# Patient Record
Sex: FEMALE | Race: WHITE | NOT HISPANIC OR LATINO | Employment: OTHER | URBAN - METROPOLITAN AREA
[De-identification: names, ages, dates, MRNs, and addresses within clinical notes are randomized per-mention and may not be internally consistent; named-entity substitution may affect disease eponyms.]

---

## 2017-03-13 ENCOUNTER — HOSPITAL ENCOUNTER (OUTPATIENT)
Dept: CARDIOLOGY | Facility: HOSPITAL | Age: 67
Discharge: HOME | End: 2017-03-13
Attending: INTERNAL MEDICINE | Admitting: INTERNAL MEDICINE

## 2017-03-13 ENCOUNTER — OFFICE VISIT (OUTPATIENT)
Dept: CARDIOLOGY | Facility: CLINIC | Age: 67
End: 2017-03-13

## 2017-03-13 VITALS — BODY MASS INDEX: 47.46 KG/M2 | RESPIRATION RATE: 18 BRPM | WEIGHT: 235 LBS

## 2017-03-13 VITALS
DIASTOLIC BLOOD PRESSURE: 70 MMHG | SYSTOLIC BLOOD PRESSURE: 122 MMHG | HEART RATE: 67 BPM | HEIGHT: 59 IN | BODY MASS INDEX: 47.37 KG/M2 | WEIGHT: 235 LBS

## 2017-03-13 DIAGNOSIS — I20.0 UNSTABLE ANGINA (HCC): Primary | ICD-10-CM

## 2017-03-13 DIAGNOSIS — I25.10 CORONARY ARTERY DISEASE INVOLVING NATIVE CORONARY ARTERY OF NATIVE HEART WITHOUT ANGINA PECTORIS: ICD-10-CM

## 2017-03-13 DIAGNOSIS — I20.0 UNSTABLE ANGINA (HCC): ICD-10-CM

## 2017-03-13 DIAGNOSIS — I10 ESSENTIAL HYPERTENSION: Primary | ICD-10-CM

## 2017-03-13 DIAGNOSIS — E66.01 MORBID OBESITY DUE TO EXCESS CALORIES (HCC): ICD-10-CM

## 2017-03-13 PROCEDURE — 93017 CV STRESS TEST TRACING ONLY: CPT

## 2017-03-13 PROCEDURE — 99213 OFFICE O/P EST LOW 20 MIN: CPT | Performed by: INTERNAL MEDICINE

## 2017-03-13 PROCEDURE — 93016 CV STRESS TEST SUPVJ ONLY: CPT | Performed by: INTERNAL MEDICINE

## 2017-03-13 PROCEDURE — 93018 CV STRESS TEST I&R ONLY: CPT | Performed by: INTERNAL MEDICINE

## 2017-03-13 RX ORDER — VENLAFAXINE 75 MG/1
75 TABLET ORAL 2 TIMES DAILY
COMMUNITY
End: 2019-11-11

## 2017-03-13 RX ORDER — LORATADINE 10 MG/1
10 TABLET ORAL DAILY
COMMUNITY

## 2017-03-13 NOTE — PROGRESS NOTES
Subjective:       Yue Joe is a 67 y.o. female who here for follow up    CC  Chest pain  HPI  67-year-old white female with the benign essential arterial hypertension as well as obesity still complains of atypical nonexertional retrosternal chest pain intermittent lasting for a few minutes     Problem List Items Addressed This Visit        Cardiovascular and Mediastinum    Essential hypertension - Primary       Digestive    Obesity        Previous treatments/evaluations include: ASA. Cardiac risk factors: advanced age (older than 55 for men, 65 for women).    The following portions of the patient's history were reviewed and updated as appropriate: allergies, current medications, past family history, past medical history, past social history, past surgical history and problem list.    Past Medical History   Diagnosis Date   • Breast cancer    • Environmental allergies    • GERD (gastroesophageal reflux disease)    • Hyperlipidemia    • Hypertension     reports that she has quit smoking. She has a 15.00 pack-year smoking history. She has never used smokeless tobacco. She reports that she does not drink alcohol or use illicit drugs.  Family History   Problem Relation Age of Onset   • Heart disease Mother    • Heart attack Mother    • Hypertension Mother    • Heart attack Father    • Heart disease Father    • Hypertension Father    • Heart attack Sister    • Hypertension Sister    • Hypertension Brother    • Heart attack Brother    • Heart attack Maternal Grandmother    • Hypertension Maternal Grandmother    • Hypertension Maternal Grandfather    • Heart attack Maternal Grandfather    • Heart attack Paternal Grandmother    • Hypertension Paternal Grandmother        Review of Systems  Constitutional: No wt loss, fever, fatigue  Gastrointestinal: No nausea, abdominal pain  Behavioral/Psych: No insomnia or anxiety   Cardiovascular chest pain  Objective:       Physical Exam             Physical Exam  Visit Vitals   •  "/70 (BP Location: Right arm, Patient Position: Sitting)   • Pulse 67   • Ht 59\" (149.9 cm)   • Wt 235 lb (107 kg)   • BMI 47.46 kg/m2       General appearance: NAD, conversant   Eyes: anicteric sclerae, moist conjunctivae; no lid-lag; PERRLA   HENT: Atraumatic; oropharynx clear with moist mucous membranes and no mucosal ulcerations;  normal hard and soft palate   Neck: Trachea midline; FROM, supple, no thyromegaly or lymphadenopathy   Lungs: CTA, with normal respiratory effort and no intercostal retractions   CV: S1-S2 regular, no murmurs, no rub, no gallop   Abdomen: Soft, non-tender; no masses or HSM   Extremities: No peripheral edema or extremity lymphadenopathy  Skin: Normal temperature, turgor and texture; no rash, ulcers or subcutaneous nodules   Psych: Appropriate affect, alert and oriented to person, place and time           Cardiographics  @Procedures    Echocardiogram:        Current Outpatient Prescriptions:   •  aspirin 81 MG tablet, Take 1 tablet by mouth daily., Disp: 30 tablet, Rfl: 11  •  clopidogrel (PLAVIX) 75 MG tablet, Take 1 tablet by mouth daily., Disp: 30 tablet, Rfl: 11  •  lisinopril (PRINIVIL,ZESTRIL) 5 MG tablet, Take 1 tablet by mouth daily., Disp: 90 tablet, Rfl: 0  •  loratadine (CLARITIN) 10 MG tablet, Take 10 mg by mouth Daily., Disp: , Rfl:   •  metoprolol tartrate (LOPRESSOR) 25 MG tablet, Take 1 tablet by mouth 2 (two) times a day., Disp: 180 tablet, Rfl: 0  •  Mirabegron ER (MYRBETRIQ) 50 MG tablet sustained-release 24 hour 24 hr tablet, Take 50 mg by mouth Every Other Day., Disp: , Rfl:   •  nitroglycerin (NITROSTAT) 0.4 MG SL tablet, 1 under the tongue as needed for angina, may repeat q5mins for up three doses, Disp: 100 tablet, Rfl: 11  •  ranitidine (ZANTAC) 150 MG tablet, Take 150 mg by mouth 2 (two) times a day., Disp: , Rfl:   •  venlafaxine (EFFEXOR) 75 MG tablet, Take 75 mg by mouth 2 (Two) Times a Day., Disp: , Rfl:   •  vitamin D (ERGOCALCIFEROL) 14489 UNITS " capsule capsule, Take 50,000 Units by mouth 1 (one) time per week., Disp: , Rfl:   •  cetirizine (ZyrTEC) 10 MG tablet, Take 10 mg by mouth daily., Disp: , Rfl:    Assessment:        Patient Active Problem List   Diagnosis   • Ischemic chest pain   • Essential hypertension   • Obesity   • Unstable angina   • Coronary artery disease involving native coronary artery of native heart without angina pectoris         HEMODYNAMIC / ANGIOGRAPHIC DATA:   1. Left ventricular end diastolic pressure was 10 mmHg.  2. Left ventriculography revealed an EF around 50%.   3. The left main is normal.  4. The left anterior descending artery is proximal 80% reduced to 0% with 2.5/15 XIENCE dilated to 2.6.  5. The left circumflex is early atherosclerotic plaque with no significant stenosis.  6. The right coronary artery is dominant with early atherosclerotic plaque.  Successful stenting of the proximal LAD, with reduction of stenosis from 80 % to 0 %.      Plan:         Importance of controlling hypertension and blood pressure  checkup on the regular basis has been explained  Hypertension as a silent killer has been discussed  Risk reduction of the weight and regular exercises to control the hypertension has been explained    Blood pressure well-controlled    Significant risk of obesity to CAD, HTN has been explained  Advantages of wt reduction has been explained      ICD-10-CM ICD-9-CM   1. Essential hypertension I10 401.9   2. Morbid obesity due to excess calories E66.01 278.01     Still have cp relieved with ntg    ETT    SEE US 6 MONTHS  COUNSELING:    Yue Farfan was given to patient for the following topics: diagnostic results, risk factor reductions, impressions, risks and benefits of treatment options and importance of treatment compliance .       SMOKING COUNSELING:    Counseling given: Not Answered      EMR Dragon/Transcription disclaimer:   Much of this encounter note is an electronic transcription/translation of  spoken language to printed text. The electronic translation of spoken language may permit erroneous, or at times, nonsensical words or phrases to be inadvertently transcribed; Although I have reviewed the note for such errors, some may still exist.

## 2017-03-14 LAB
BH CV STRESS BP STAGE 1: NORMAL
BH CV STRESS BP STAGE 2: NORMAL
BH CV STRESS BP STAGE 3: NORMAL
BH CV STRESS DURATION MIN STAGE 1: 3
BH CV STRESS DURATION MIN STAGE 2: 3
BH CV STRESS DURATION MIN STAGE 3: 1
BH CV STRESS DURATION SEC STAGE 1: 0
BH CV STRESS DURATION SEC STAGE 2: 0
BH CV STRESS DURATION SEC STAGE 3: 54
BH CV STRESS GRADE STAGE 1: 0
BH CV STRESS GRADE STAGE 2: 5
BH CV STRESS GRADE STAGE 3: 10
BH CV STRESS HR STAGE 1: 100
BH CV STRESS HR STAGE 2: 118
BH CV STRESS HR STAGE 3: 127
BH CV STRESS METS STAGE 1: 2.3
BH CV STRESS METS STAGE 2: 3.5
BH CV STRESS METS STAGE 3: 4.5
BH CV STRESS O2 STAGE 3: 93
BH CV STRESS PROTOCOL 1: NORMAL
BH CV STRESS RECOVERY BP: NORMAL MMHG
BH CV STRESS RECOVERY HR: 75 BPM
BH CV STRESS RECOVERY O2: 94 %
BH CV STRESS SPEED STAGE 1: 1.7
BH CV STRESS SPEED STAGE 2: 1.7
BH CV STRESS SPEED STAGE 3: 1.7
BH CV STRESS STAGE 1: 0
BH CV STRESS STAGE 2: NORMAL
BH CV STRESS STAGE 3: 1
MAXIMAL PREDICTED HEART RATE: 155 BPM
PERCENT MAX PREDICTED HR: 81.94 %
STRESS BASELINE BP: NORMAL MMHG
STRESS BASELINE HR: 65 BPM
STRESS O2 SAT REST: 95 %
STRESS PERCENT HR: 96 %
STRESS POST ESTIMATED WORKLOAD: 4.5 METS
STRESS POST EXERCISE DUR MIN: 7 MIN
STRESS POST EXERCISE DUR SEC: 54 SEC
STRESS POST O2 SAT PEAK: 93 %
STRESS POST PEAK BP: NORMAL MMHG
STRESS POST PEAK HR: 127 BPM
STRESS TARGET HR: 132 BPM

## 2017-07-13 ENCOUNTER — OFFICE VISIT (OUTPATIENT)
Dept: CARDIOLOGY | Facility: CLINIC | Age: 67
End: 2017-07-13

## 2017-07-13 VITALS
HEIGHT: 59 IN | SYSTOLIC BLOOD PRESSURE: 132 MMHG | DIASTOLIC BLOOD PRESSURE: 86 MMHG | HEART RATE: 73 BPM | WEIGHT: 236 LBS | BODY MASS INDEX: 47.58 KG/M2

## 2017-07-13 DIAGNOSIS — R07.89 OTHER CHEST PAIN: ICD-10-CM

## 2017-07-13 DIAGNOSIS — Z01.810 PRE-OPERATIVE CARDIOVASCULAR EXAMINATION: ICD-10-CM

## 2017-07-13 DIAGNOSIS — I20.9 ISCHEMIC CHEST PAIN (HCC): Primary | ICD-10-CM

## 2017-07-13 PROCEDURE — 99213 OFFICE O/P EST LOW 20 MIN: CPT | Performed by: INTERNAL MEDICINE

## 2017-07-13 PROCEDURE — 93000 ELECTROCARDIOGRAM COMPLETE: CPT | Performed by: INTERNAL MEDICINE

## 2017-07-13 RX ORDER — PANTOPRAZOLE SODIUM 40 MG/1
40 TABLET, DELAYED RELEASE ORAL DAILY
COMMUNITY
End: 2020-10-06 | Stop reason: HOSPADM

## 2017-07-13 NOTE — PROGRESS NOTES
" Subjective:       Yue Joe is a 67 y.o. female who here for follow up    CC  Cp 4 days ago went to er, with burning and sob  HPI  67-year-old white female was initially evaluated in March 2017 for the chest pain, had a stress test which was negative, July 2016 patient underwent angioplasty and stent, was seen in emergency room 4 days ago because of the chest pains burning in nature with shortness of breath, moderate severe in intensity     Problem List Items Addressed This Visit     None        .    The following portions of the patient's history were reviewed and updated as appropriate: allergies, current medications, past family history, past medical history, past social history, past surgical history and problem list.    Past Medical History:   Diagnosis Date   • Breast cancer    • Environmental allergies    • GERD (gastroesophageal reflux disease)    • Hyperlipidemia    • Hypertension     reports that she has quit smoking. She has a 15.00 pack-year smoking history. She has never used smokeless tobacco. She reports that she does not drink alcohol or use illicit drugs.  Family History   Problem Relation Age of Onset   • Heart disease Mother    • Heart attack Mother    • Hypertension Mother    • Heart attack Father    • Heart disease Father    • Hypertension Father    • Heart attack Sister    • Hypertension Sister    • Hypertension Brother    • Heart attack Brother    • Heart attack Maternal Grandmother    • Hypertension Maternal Grandmother    • Hypertension Maternal Grandfather    • Heart attack Maternal Grandfather    • Heart attack Paternal Grandmother    • Hypertension Paternal Grandmother        Review of Systems  Constitutional: No wt loss, fever, fatigue  Gastrointestinal: No nausea, abdominal pain  Behavioral/Psych: No insomnia or anxiety   Cardiovascular Chest pains and tightness in chest with a burning sensation  Objective:       Physical Exam             Physical Exam  /86  Pulse 73  Ht 59\" " (149.9 cm)  Wt 236 lb (107 kg)  BMI 47.67 kg/m2    General appearance: NAD, conversant   Eyes: anicteric sclerae, moist conjunctivae; no lid-lag; PERRLA   HENT: Atraumatic; oropharynx clear with moist mucous membranes and no mucosal ulcerations;  normal hard and soft palate   Neck: Trachea midline; FROM, supple, no thyromegaly or lymphadenopathy   Lungs: CTA, with normal respiratory effort and no intercostal retractions   CV: S1-S2 regular, no murmurs, no rub, no gallop   Abdomen: Soft, non-tender; no masses or HSM   Extremities: No peripheral edema or extremity lymphadenopathy  Skin: Normal temperature, turgor and texture; no rash, ulcers or subcutaneous nodules   Psych: Appropriate affect, alert and oriented to person, place and time           Cardiographics  @  ECG 12 Lead  Date/Time: 7/13/2017 2:31 PM  Performed by: SURESH LYNN  Authorized by: SURESH LYNN   Comparison: compared with previous ECG   Comparison to previous ECG: pvcs are new  Rhythm: sinus rhythm  ST Flattening: all  Clinical impression: abnormal ECG            Echocardiogram:        Current Outpatient Prescriptions:   •  aspirin 81 MG tablet, Take 1 tablet by mouth daily., Disp: 30 tablet, Rfl: 11  •  cetirizine (ZyrTEC) 10 MG tablet, Take 10 mg by mouth daily., Disp: , Rfl:   •  clopidogrel (PLAVIX) 75 MG tablet, Take 1 tablet by mouth daily., Disp: 30 tablet, Rfl: 11  •  lisinopril (PRINIVIL,ZESTRIL) 5 MG tablet, Take 1 tablet by mouth daily., Disp: 90 tablet, Rfl: 0  •  loratadine (CLARITIN) 10 MG tablet, Take 10 mg by mouth Daily., Disp: , Rfl:   •  metoprolol tartrate (LOPRESSOR) 25 MG tablet, Take 1 tablet by mouth 2 (two) times a day., Disp: 180 tablet, Rfl: 0  •  pantoprazole (PROTONIX) 40 MG EC tablet, Take 40 mg by mouth Daily., Disp: , Rfl:   •  venlafaxine (EFFEXOR) 75 MG tablet, Take 75 mg by mouth 2 (Two) Times a Day., Disp: , Rfl:   •  vitamin D (ERGOCALCIFEROL) 05719 UNITS capsule capsule, Take 50,000 Units by  mouth 1 (one) time per week., Disp: , Rfl:   •  Mirabegron ER (MYRBETRIQ) 50 MG tablet sustained-release 24 hour 24 hr tablet, Take 50 mg by mouth Every Other Day., Disp: , Rfl:   •  nitroglycerin (NITROSTAT) 0.4 MG SL tablet, 1 under the tongue as needed for angina, may repeat q5mins for up three doses, Disp: 100 tablet, Rfl: 11   Assessment:        Patient Active Problem List   Diagnosis   • Ischemic chest pain   • Essential hypertension   • Obesity   • Unstable angina   • Coronary artery disease involving native coronary artery of native heart without angina pectoris     HEMODYNAMIC / ANGIOGRAPHIC DATA:   1. Left ventricular end diastolic pressure was 10 mmHg.  2. Left ventriculography revealed an EF around 50%.   3. The left main is normal.  4. The left anterior descending artery is proximal 80% reduced to 0% with 2.5/15 XIENCE dilated to 2.6.  5. The left circumflex is early atherosclerotic plaque with no significant stenosis.  6. The right coronary artery is dominant with early atherosclerotic plaque.  7. Successful stenting of the proximal LAD, with reduction of stenosis from 80 % to 0 %.     RECOMMENDATIONS: Post-procedure care will focus on prevention of any ischemic events and congestive complications. Aggressive risk factor modification will be carried out.     Type of lesion B plus     ANG flow 3 preangioplasty 3 post angioplasty             Plan:            ICD-10-CM ICD-9-CM   1. Ischemic chest pain I20.9 786.50   2. Pre-operative cardiovascular examination Z01.810 V72.81   3. Other chest pain  R07.89 786.59     1. Ischemic chest pain  With a recurrent episodes of chest pains seated in October the emergency room patient will need further ischemic workup  - ECG 12 Lead  - Case Request Cath Lab: Left Heart Cath  - CBC & Differential  - Basic Metabolic Panel  - aPTT  - Protime-INR    2. Pre-operative cardiovascular examination    - CBC & Differential  - Basic Metabolic Panel  - aPTT  - Protime-INR    3.  Other chest pain     - aPTT   Recurrent cp    Unstable angina  Will proceed with cath    Procedure, risks and options of cardiac cath explained to pt INCLUDING BUT NOT LIMITED TO MI, STROKE, DEATH, INFECTION HAEMORRHAGE, . Pt understands well and agrees with no further questions.  COUNSELING:    Yue Farfan was given to patient for the following topics: diagnostic results, risk factor reductions, impressions, risks and benefits of treatment options and importance of treatment compliance .       SMOKING COUNSELING:    Counseling given: Not Answered      EMR Dragon/Transcription disclaimer:   Much of this encounter note is an electronic transcription/translation of spoken language to printed text. The electronic translation of spoken language may permit erroneous, or at times, nonsensical words or phrases to be inadvertently transcribed; Although I have reviewed the note for such errors, some may still exist.

## 2017-07-19 ENCOUNTER — APPOINTMENT (OUTPATIENT)
Dept: LAB | Facility: HOSPITAL | Age: 67
End: 2017-07-19

## 2017-07-19 LAB
ANION GAP SERPL CALCULATED.3IONS-SCNC: 11.2 MMOL/L
APTT PPP: 25.3 SECONDS (ref 22.7–35.4)
BASOPHILS # BLD AUTO: 0.05 10*3/MM3 (ref 0–0.2)
BASOPHILS NFR BLD AUTO: 0.7 % (ref 0–1.5)
BUN BLD-MCNC: 14 MG/DL (ref 8–23)
BUN/CREAT SERPL: 15.6 (ref 7–25)
CALCIUM SPEC-SCNC: 10 MG/DL (ref 8.6–10.5)
CHLORIDE SERPL-SCNC: 102 MMOL/L (ref 98–107)
CO2 SERPL-SCNC: 26.8 MMOL/L (ref 22–29)
CREAT BLD-MCNC: 0.9 MG/DL (ref 0.57–1)
DEPRECATED RDW RBC AUTO: 43.5 FL (ref 37–54)
EOSINOPHIL # BLD AUTO: 0.14 10*3/MM3 (ref 0–0.7)
EOSINOPHIL NFR BLD AUTO: 1.9 % (ref 0.3–6.2)
ERYTHROCYTE [DISTWIDTH] IN BLOOD BY AUTOMATED COUNT: 13 % (ref 11.7–13)
GFR SERPL CREATININE-BSD FRML MDRD: 63 ML/MIN/1.73
GLUCOSE BLD-MCNC: 106 MG/DL (ref 65–99)
HCT VFR BLD AUTO: 49 % (ref 35.6–45.5)
HGB BLD-MCNC: 16 G/DL (ref 11.9–15.5)
IMM GRANULOCYTES # BLD: 0.03 10*3/MM3 (ref 0–0.03)
IMM GRANULOCYTES NFR BLD: 0.4 % (ref 0–0.5)
INR PPP: 1.03 (ref 0.9–1.1)
LYMPHOCYTES # BLD AUTO: 2.81 10*3/MM3 (ref 0.9–4.8)
LYMPHOCYTES NFR BLD AUTO: 38.2 % (ref 19.6–45.3)
MCH RBC QN AUTO: 30.1 PG (ref 26.9–32)
MCHC RBC AUTO-ENTMCNC: 32.7 G/DL (ref 32.4–36.3)
MCV RBC AUTO: 92.1 FL (ref 80.5–98.2)
MONOCYTES # BLD AUTO: 0.46 10*3/MM3 (ref 0.2–1.2)
MONOCYTES NFR BLD AUTO: 6.3 % (ref 5–12)
NEUTROPHILS # BLD AUTO: 3.87 10*3/MM3 (ref 1.9–8.1)
NEUTROPHILS NFR BLD AUTO: 52.5 % (ref 42.7–76)
PLATELET # BLD AUTO: 241 10*3/MM3 (ref 140–500)
PMV BLD AUTO: 10.4 FL (ref 6–12)
POTASSIUM BLD-SCNC: 4.5 MMOL/L (ref 3.5–5.2)
PROTHROMBIN TIME: 13.1 SECONDS (ref 11.7–14.2)
RBC # BLD AUTO: 5.32 10*6/MM3 (ref 3.9–5.2)
SODIUM BLD-SCNC: 140 MMOL/L (ref 136–145)
WBC NRBC COR # BLD: 7.36 10*3/MM3 (ref 4.5–10.7)

## 2017-07-19 PROCEDURE — 85025 COMPLETE CBC W/AUTO DIFF WBC: CPT | Performed by: INTERNAL MEDICINE

## 2017-07-19 PROCEDURE — 85730 THROMBOPLASTIN TIME PARTIAL: CPT | Performed by: INTERNAL MEDICINE

## 2017-07-19 PROCEDURE — 80048 BASIC METABOLIC PNL TOTAL CA: CPT | Performed by: INTERNAL MEDICINE

## 2017-07-19 PROCEDURE — 36415 COLL VENOUS BLD VENIPUNCTURE: CPT | Performed by: INTERNAL MEDICINE

## 2017-07-19 PROCEDURE — 85610 PROTHROMBIN TIME: CPT | Performed by: INTERNAL MEDICINE

## 2017-07-20 RX ORDER — CLOPIDOGREL BISULFATE 75 MG/1
75 TABLET ORAL DAILY
COMMUNITY

## 2017-07-20 RX ORDER — NITROGLYCERIN 0.4 MG/1
0.4 TABLET SUBLINGUAL
COMMUNITY
End: 2019-08-08 | Stop reason: SDUPTHER

## 2017-07-20 RX ORDER — LISINOPRIL 5 MG/1
5 TABLET ORAL DAILY
Status: ON HOLD | COMMUNITY
End: 2017-07-21

## 2017-07-21 ENCOUNTER — HOSPITAL ENCOUNTER (OUTPATIENT)
Facility: HOSPITAL | Age: 67
Setting detail: HOSPITAL OUTPATIENT SURGERY
Discharge: HOME | End: 2017-07-21
Attending: INTERNAL MEDICINE | Admitting: INTERNAL MEDICINE

## 2017-07-21 VITALS
HEIGHT: 59 IN | DIASTOLIC BLOOD PRESSURE: 86 MMHG | HEART RATE: 56 BPM | RESPIRATION RATE: 18 BRPM | BODY MASS INDEX: 46.37 KG/M2 | TEMPERATURE: 98.7 F | SYSTOLIC BLOOD PRESSURE: 154 MMHG | WEIGHT: 230 LBS | OXYGEN SATURATION: 97 %

## 2017-07-21 DIAGNOSIS — I20.9 ISCHEMIC CHEST PAIN (HCC): ICD-10-CM

## 2017-07-21 PROCEDURE — 93458 L HRT ARTERY/VENTRICLE ANGIO: CPT | Performed by: INTERNAL MEDICINE

## 2017-07-21 PROCEDURE — C1894 INTRO/SHEATH, NON-LASER: HCPCS | Performed by: INTERNAL MEDICINE

## 2017-07-21 PROCEDURE — 25010000002 MIDAZOLAM PER 1 MG: Performed by: INTERNAL MEDICINE

## 2017-07-21 PROCEDURE — 25010000002 FENTANYL CITRATE (PF) 100 MCG/2ML SOLUTION: Performed by: INTERNAL MEDICINE

## 2017-07-21 PROCEDURE — 25010000002 HEPARIN (PORCINE) PER 1000 UNITS: Performed by: INTERNAL MEDICINE

## 2017-07-21 PROCEDURE — 0 IOPAMIDOL PER 1 ML: Performed by: INTERNAL MEDICINE

## 2017-07-21 PROCEDURE — 99152 MOD SED SAME PHYS/QHP 5/>YRS: CPT | Performed by: INTERNAL MEDICINE

## 2017-07-21 PROCEDURE — C1769 GUIDE WIRE: HCPCS | Performed by: INTERNAL MEDICINE

## 2017-07-21 RX ORDER — SODIUM CHLORIDE 9 MG/ML
75 INJECTION, SOLUTION INTRAVENOUS CONTINUOUS
Status: DISCONTINUED | OUTPATIENT
Start: 2017-07-21 | End: 2017-07-21 | Stop reason: HOSPADM

## 2017-07-21 RX ORDER — SODIUM CHLORIDE 0.9 % (FLUSH) 0.9 %
1-10 SYRINGE (ML) INJECTION AS NEEDED
Status: DISCONTINUED | OUTPATIENT
Start: 2017-07-21 | End: 2017-07-21 | Stop reason: HOSPADM

## 2017-07-21 RX ORDER — SODIUM CHLORIDE 9 MG/ML
100 INJECTION, SOLUTION INTRAVENOUS CONTINUOUS
Status: CANCELLED | OUTPATIENT
Start: 2017-07-21

## 2017-07-21 RX ORDER — MIDAZOLAM HYDROCHLORIDE 1 MG/ML
INJECTION INTRAMUSCULAR; INTRAVENOUS AS NEEDED
Status: DISCONTINUED | OUTPATIENT
Start: 2017-07-21 | End: 2017-07-21 | Stop reason: HOSPADM

## 2017-07-21 RX ORDER — LIDOCAINE HYDROCHLORIDE 20 MG/ML
INJECTION, SOLUTION INFILTRATION; PERINEURAL AS NEEDED
Status: DISCONTINUED | OUTPATIENT
Start: 2017-07-21 | End: 2017-07-21 | Stop reason: HOSPADM

## 2017-07-21 RX ORDER — LISINOPRIL 5 MG/1
5 TABLET ORAL DAILY
Status: ON HOLD | COMMUNITY
End: 2020-01-29 | Stop reason: DRUGHIGH

## 2017-07-21 RX ORDER — SODIUM CHLORIDE 0.9 % (FLUSH) 0.9 %
1-10 SYRINGE (ML) INJECTION AS NEEDED
Status: CANCELLED | OUTPATIENT
Start: 2017-07-21

## 2017-07-21 RX ORDER — LIDOCAINE HYDROCHLORIDE 10 MG/ML
0.1 INJECTION, SOLUTION EPIDURAL; INFILTRATION; INTRACAUDAL; PERINEURAL ONCE AS NEEDED
Status: DISCONTINUED | OUTPATIENT
Start: 2017-07-21 | End: 2017-07-21 | Stop reason: HOSPADM

## 2017-07-21 RX ORDER — FENTANYL CITRATE 50 UG/ML
INJECTION, SOLUTION INTRAMUSCULAR; INTRAVENOUS AS NEEDED
Status: DISCONTINUED | OUTPATIENT
Start: 2017-07-21 | End: 2017-07-21 | Stop reason: HOSPADM

## 2017-07-21 RX ADMIN — SODIUM CHLORIDE 75 ML/HR: 9 INJECTION, SOLUTION INTRAVENOUS at 09:38

## 2017-07-21 NOTE — H&P (VIEW-ONLY)
" Subjective:       Yue Joe is a 67 y.o. female who here for follow up    CC  Cp 4 days ago went to er, with burning and sob  HPI  67-year-old white female was initially evaluated in March 2017 for the chest pain, had a stress test which was negative, July 2016 patient underwent angioplasty and stent, was seen in emergency room 4 days ago because of the chest pains burning in nature with shortness of breath, moderate severe in intensity     Problem List Items Addressed This Visit     None        .    The following portions of the patient's history were reviewed and updated as appropriate: allergies, current medications, past family history, past medical history, past social history, past surgical history and problem list.    Past Medical History:   Diagnosis Date   • Breast cancer    • Environmental allergies    • GERD (gastroesophageal reflux disease)    • Hyperlipidemia    • Hypertension     reports that she has quit smoking. She has a 15.00 pack-year smoking history. She has never used smokeless tobacco. She reports that she does not drink alcohol or use illicit drugs.  Family History   Problem Relation Age of Onset   • Heart disease Mother    • Heart attack Mother    • Hypertension Mother    • Heart attack Father    • Heart disease Father    • Hypertension Father    • Heart attack Sister    • Hypertension Sister    • Hypertension Brother    • Heart attack Brother    • Heart attack Maternal Grandmother    • Hypertension Maternal Grandmother    • Hypertension Maternal Grandfather    • Heart attack Maternal Grandfather    • Heart attack Paternal Grandmother    • Hypertension Paternal Grandmother        Review of Systems  Constitutional: No wt loss, fever, fatigue  Gastrointestinal: No nausea, abdominal pain  Behavioral/Psych: No insomnia or anxiety   Cardiovascular Chest pains and tightness in chest with a burning sensation  Objective:       Physical Exam             Physical Exam  /86  Pulse 73  Ht 59\" " (149.9 cm)  Wt 236 lb (107 kg)  BMI 47.67 kg/m2    General appearance: NAD, conversant   Eyes: anicteric sclerae, moist conjunctivae; no lid-lag; PERRLA   HENT: Atraumatic; oropharynx clear with moist mucous membranes and no mucosal ulcerations;  normal hard and soft palate   Neck: Trachea midline; FROM, supple, no thyromegaly or lymphadenopathy   Lungs: CTA, with normal respiratory effort and no intercostal retractions   CV: S1-S2 regular, no murmurs, no rub, no gallop   Abdomen: Soft, non-tender; no masses or HSM   Extremities: No peripheral edema or extremity lymphadenopathy  Skin: Normal temperature, turgor and texture; no rash, ulcers or subcutaneous nodules   Psych: Appropriate affect, alert and oriented to person, place and time           Cardiographics  @  ECG 12 Lead  Date/Time: 7/13/2017 2:31 PM  Performed by: SURESH LYNN  Authorized by: SURESH LYNN   Comparison: compared with previous ECG   Comparison to previous ECG: pvcs are new  Rhythm: sinus rhythm  ST Flattening: all  Clinical impression: abnormal ECG            Echocardiogram:        Current Outpatient Prescriptions:   •  aspirin 81 MG tablet, Take 1 tablet by mouth daily., Disp: 30 tablet, Rfl: 11  •  cetirizine (ZyrTEC) 10 MG tablet, Take 10 mg by mouth daily., Disp: , Rfl:   •  clopidogrel (PLAVIX) 75 MG tablet, Take 1 tablet by mouth daily., Disp: 30 tablet, Rfl: 11  •  lisinopril (PRINIVIL,ZESTRIL) 5 MG tablet, Take 1 tablet by mouth daily., Disp: 90 tablet, Rfl: 0  •  loratadine (CLARITIN) 10 MG tablet, Take 10 mg by mouth Daily., Disp: , Rfl:   •  metoprolol tartrate (LOPRESSOR) 25 MG tablet, Take 1 tablet by mouth 2 (two) times a day., Disp: 180 tablet, Rfl: 0  •  pantoprazole (PROTONIX) 40 MG EC tablet, Take 40 mg by mouth Daily., Disp: , Rfl:   •  venlafaxine (EFFEXOR) 75 MG tablet, Take 75 mg by mouth 2 (Two) Times a Day., Disp: , Rfl:   •  vitamin D (ERGOCALCIFEROL) 19202 UNITS capsule capsule, Take 50,000 Units by  mouth 1 (one) time per week., Disp: , Rfl:   •  Mirabegron ER (MYRBETRIQ) 50 MG tablet sustained-release 24 hour 24 hr tablet, Take 50 mg by mouth Every Other Day., Disp: , Rfl:   •  nitroglycerin (NITROSTAT) 0.4 MG SL tablet, 1 under the tongue as needed for angina, may repeat q5mins for up three doses, Disp: 100 tablet, Rfl: 11   Assessment:        Patient Active Problem List   Diagnosis   • Ischemic chest pain   • Essential hypertension   • Obesity   • Unstable angina   • Coronary artery disease involving native coronary artery of native heart without angina pectoris     HEMODYNAMIC / ANGIOGRAPHIC DATA:   1. Left ventricular end diastolic pressure was 10 mmHg.  2. Left ventriculography revealed an EF around 50%.   3. The left main is normal.  4. The left anterior descending artery is proximal 80% reduced to 0% with 2.5/15 XIENCE dilated to 2.6.  5. The left circumflex is early atherosclerotic plaque with no significant stenosis.  6. The right coronary artery is dominant with early atherosclerotic plaque.  7. Successful stenting of the proximal LAD, with reduction of stenosis from 80 % to 0 %.     RECOMMENDATIONS: Post-procedure care will focus on prevention of any ischemic events and congestive complications. Aggressive risk factor modification will be carried out.     Type of lesion B plus     ANG flow 3 preangioplasty 3 post angioplasty             Plan:            ICD-10-CM ICD-9-CM   1. Ischemic chest pain I20.9 786.50   2. Pre-operative cardiovascular examination Z01.810 V72.81   3. Other chest pain  R07.89 786.59     1. Ischemic chest pain  With a recurrent episodes of chest pains seated in October the emergency room patient will need further ischemic workup  - ECG 12 Lead  - Case Request Cath Lab: Left Heart Cath  - CBC & Differential  - Basic Metabolic Panel  - aPTT  - Protime-INR    2. Pre-operative cardiovascular examination    - CBC & Differential  - Basic Metabolic Panel  - aPTT  - Protime-INR    3.  Other chest pain     - aPTT   Recurrent cp    Unstable angina  Will proceed with cath    Procedure, risks and options of cardiac cath explained to pt INCLUDING BUT NOT LIMITED TO MI, STROKE, DEATH, INFECTION HAEMORRHAGE, . Pt understands well and agrees with no further questions.  COUNSELING:    Yue Farfan was given to patient for the following topics: diagnostic results, risk factor reductions, impressions, risks and benefits of treatment options and importance of treatment compliance .       SMOKING COUNSELING:    Counseling given: Not Answered      EMR Dragon/Transcription disclaimer:   Much of this encounter note is an electronic transcription/translation of spoken language to printed text. The electronic translation of spoken language may permit erroneous, or at times, nonsensical words or phrases to be inadvertently transcribed; Although I have reviewed the note for such errors, some may still exist.

## 2017-07-21 NOTE — DISCHARGE INSTRUCTIONS
[facility]  [address] [place] [address]    Coronary Angiogram (Radial/Ulnar Approach) After Care    Refer to this sheet in the next few weeks. These instructions provide you with information on caring for yourself after your procedure. Your caregiver may also give you more specific instructions. Your treatment has been planned according to current medical practices, but problems sometimes occur. Call your caregiver if you have any problems or questions after your procedure.    Home Care Instructions:  · You may shower the day after the procedure. Remove the bandage (dressing) and gently wash the site with plain soap and water. Gently pat the site dry. You may apply a band aid daily for 2 days if desired.    · Do not apply powder or lotion to the site.  · Do not submerge the affected site in water for 3 to 5 days or until the site is completely healed.   · Do not lift, push or pull anything over 10 pounds for 2 days after your procedure.  · Inspect the site at least twice daily. You may notice some bruising at the site and it may be tender for 1 to 2 weeks.     · Increase your fluid intake for the next 2 days.    · Keep arm elevated for 24 hours. For the remainder of the day, keep your arm in “Pledge of Allegiance” position when up and about.     · You may drive 24 hours after the procedure unless otherwise instructed by your caregiver.  · Do not operate machinery or power tools for 24 hours.  · A responsible adult should be with you for the first 24 hours after you arrive home. Do not make any important legal decisions or sign legal papers for 24 hours.      Call Your Doctor if:   · You have unusual pain at the radial/ulnar (wrist) site.  · You have redness, warmth, swelling, or pain at the radial/ulnar (wrist) site.  · You have drainage (other than a small amount of blood on the dressing).  · You have chills or a fever > 101.  · Your arm becomes pale or dark, cool, tingly, or numb.  · You  have heavy bleeding from the site, hold pressure on the site for 20 minutes.  If the bleeding stops, apply a fresh bandage and call your cardiologist.  However, if you continue to have bleeding, call 911.     ·   SEDATION DISCHARGE INSTRUCTIONS.    IMPORTANT: The following information will help you return to your best level of health.  Sedation.  You have had a procedure that called for some medicine to reduce anxiety and pain. This medicine (or medicines) is called  sedation. After receiving the medicine, you may be sleepy, but able to breathe on your own. The effects of the medicine may last for several hours.    Follow these instructions after sedation:   Go right home. Rest quietly at home today, then you can be up and about.   Do not drink alcohol, drive or operate machinery for 24 hours.   Do not do anything where light-headedness or clumsiness would be dangerous.   Do not make important decisions or sign any legal papers for the next 24 hours.   Make sure A RESPONSIBLE PERSON stays with you the rest of today and overnight for your protection and safety.   Start your diet with fluids and light foods (jello, soup, juice, toast). Then, slowly progress to your usual diet if you are not sick to your stomach.    Call your doctor if you have:   a gray or blue skin color.   excess sleepiness.   repeated vomiting.   trouble breathing.  ·  any new problems or concerns.

## 2017-07-21 NOTE — PLAN OF CARE
Problem: Patient Care Overview (Adult)  Goal: Plan of Care Review  Outcome: Outcome(s) achieved Date Met:  07/21/17 07/21/17 1237   Coping/Psychosocial Response Interventions   Plan Of Care Reviewed With patient;spouse   Patient Care Overview   Progress improving   Outcome Evaluation   Outcome Summary/Follow up Plan ready for discharge       Goal: Adult Individualization and Mutuality  Outcome: Outcome(s) achieved Date Met:  07/21/17  Goal: Discharge Needs Assessment  Outcome: Outcome(s) achieved Date Met:  07/21/17    Problem: Cardiac Catheterization with/without PCI (Adult)  Goal: Signs and Symptoms of Listed Potential Problems Will be Absent or Manageable (Cardiac Catheterization with/without PCI)  Outcome: Outcome(s) achieved Date Met:  07/21/17 07/21/17 1237   Cardiac Catheterization with/without PCI   Problems Assessed (Cardiac Catheterization) all   Problems Present (Cardiac Catheterization) none

## 2019-06-27 ENCOUNTER — OFFICE VISIT (OUTPATIENT)
Dept: CARDIOLOGY | Facility: CLINIC | Age: 69
End: 2019-06-27

## 2019-06-27 VITALS
DIASTOLIC BLOOD PRESSURE: 81 MMHG | SYSTOLIC BLOOD PRESSURE: 120 MMHG | HEART RATE: 74 BPM | WEIGHT: 229 LBS | HEIGHT: 59 IN | BODY MASS INDEX: 46.16 KG/M2

## 2019-06-27 DIAGNOSIS — R94.31 ABNORMAL EKG: ICD-10-CM

## 2019-06-27 DIAGNOSIS — I10 ESSENTIAL HYPERTENSION: ICD-10-CM

## 2019-06-27 DIAGNOSIS — R06.02 SOB (SHORTNESS OF BREATH): ICD-10-CM

## 2019-06-27 DIAGNOSIS — E66.09 CLASS 1 OBESITY DUE TO EXCESS CALORIES WITH SERIOUS COMORBIDITY AND BODY MASS INDEX (BMI) OF 30.0 TO 30.9 IN ADULT: ICD-10-CM

## 2019-06-27 DIAGNOSIS — I25.10 CORONARY ARTERY DISEASE INVOLVING NATIVE CORONARY ARTERY OF NATIVE HEART WITHOUT ANGINA PECTORIS: ICD-10-CM

## 2019-06-27 DIAGNOSIS — R07.2 PRECORDIAL PAIN: Primary | ICD-10-CM

## 2019-06-27 PROCEDURE — 93000 ELECTROCARDIOGRAM COMPLETE: CPT | Performed by: INTERNAL MEDICINE

## 2019-06-27 PROCEDURE — 99213 OFFICE O/P EST LOW 20 MIN: CPT | Performed by: INTERNAL MEDICINE

## 2019-07-29 ENCOUNTER — HOSPITAL ENCOUNTER (OUTPATIENT)
Dept: CARDIOLOGY | Facility: HOSPITAL | Age: 69
End: 2019-07-29

## 2019-07-29 ENCOUNTER — HOSPITAL ENCOUNTER (OUTPATIENT)
Dept: CARDIOLOGY | Facility: HOSPITAL | Age: 69
Discharge: HOME | End: 2019-07-29

## 2019-07-29 PROCEDURE — 93306 TTE W/DOPPLER COMPLETE: CPT

## 2019-08-01 ENCOUNTER — HOSPITAL ENCOUNTER (OUTPATIENT)
Dept: CARDIOLOGY | Facility: HOSPITAL | Age: 69
Discharge: HOME | End: 2019-08-01

## 2019-08-01 ENCOUNTER — HOSPITAL ENCOUNTER (OUTPATIENT)
Dept: CARDIOLOGY | Facility: HOSPITAL | Age: 69
Discharge: HOME | End: 2019-08-01
Admitting: INTERNAL MEDICINE

## 2019-08-01 VITALS
HEIGHT: 59 IN | BODY MASS INDEX: 46.16 KG/M2 | HEART RATE: 76 BPM | WEIGHT: 229 LBS | DIASTOLIC BLOOD PRESSURE: 78 MMHG | SYSTOLIC BLOOD PRESSURE: 118 MMHG | OXYGEN SATURATION: 96 %

## 2019-08-01 LAB
BH CV STRESS BP STAGE 1: NORMAL
BH CV STRESS DURATION MIN STAGE 1: 3
BH CV STRESS DURATION SEC STAGE 1: 0
BH CV STRESS GRADE STAGE 1: 0
BH CV STRESS HR STAGE 1: 106
BH CV STRESS METS STAGE 1: 2.3
BH CV STRESS PROTOCOL 1: NORMAL
BH CV STRESS PROTOCOL 2 BP STAGE 1: NORMAL
BH CV STRESS PROTOCOL 2 COMMENTS STAGE 1: NORMAL
BH CV STRESS PROTOCOL 2 DOSE REGADENOSON STAGE 1: 0.4
BH CV STRESS PROTOCOL 2 DURATION MIN STAGE 1: 2
BH CV STRESS PROTOCOL 2 DURATION SEC STAGE 1: 56
BH CV STRESS PROTOCOL 2 HR STAGE 1: 109
BH CV STRESS PROTOCOL 2 O2 STAGE 1: 91
BH CV STRESS PROTOCOL 2 STAGE 1: 1
BH CV STRESS PROTOCOL 2: NORMAL
BH CV STRESS RECOVERY BP: NORMAL MMHG
BH CV STRESS RECOVERY HR: 75 BPM
BH CV STRESS RECOVERY O2: 95 %
BH CV STRESS SPEED STAGE 1: 1.7
BH CV STRESS STAGE 1: 1
LV EF NUC BP: 60 %
MAXIMAL PREDICTED HEART RATE: 153 BPM
PERCENT MAX PREDICTED HR: 71.24 %
STRESS BASELINE BP: NORMAL MMHG
STRESS BASELINE HR: 76 BPM
STRESS O2 SAT REST: 96 %
STRESS PERCENT HR: 84 %
STRESS POST ESTIMATED WORKLOAD: 2.3 METS
STRESS POST EXERCISE DUR MIN: 5 MIN
STRESS POST EXERCISE DUR SEC: 1 SEC
STRESS POST O2 SAT PEAK: 91 %
STRESS POST PEAK BP: NORMAL MMHG
STRESS POST PEAK HR: 109 BPM
STRESS TARGET HR: 130 BPM

## 2019-08-01 PROCEDURE — 93018 CV STRESS TEST I&R ONLY: CPT | Performed by: INTERNAL MEDICINE

## 2019-08-01 PROCEDURE — A9500 TC99M SESTAMIBI: HCPCS | Performed by: INTERNAL MEDICINE

## 2019-08-01 PROCEDURE — 78452 HT MUSCLE IMAGE SPECT MULT: CPT

## 2019-08-01 PROCEDURE — 93017 CV STRESS TEST TRACING ONLY: CPT

## 2019-08-01 PROCEDURE — 93306 TTE W/DOPPLER COMPLETE: CPT | Performed by: INTERNAL MEDICINE

## 2019-08-01 PROCEDURE — 93306 TTE W/DOPPLER COMPLETE: CPT

## 2019-08-01 PROCEDURE — 78452 HT MUSCLE IMAGE SPECT MULT: CPT | Performed by: INTERNAL MEDICINE

## 2019-08-01 PROCEDURE — 93016 CV STRESS TEST SUPVJ ONLY: CPT | Performed by: INTERNAL MEDICINE

## 2019-08-01 PROCEDURE — 25010000002 REGADENOSON 0.4 MG/5ML SOLUTION: Performed by: INTERNAL MEDICINE

## 2019-08-01 PROCEDURE — 0 TECHNETIUM SESTAMIBI: Performed by: INTERNAL MEDICINE

## 2019-08-01 RX ORDER — PRAVASTATIN SODIUM 20 MG
TABLET ORAL
COMMUNITY
End: 2020-10-06 | Stop reason: HOSPADM

## 2019-08-01 RX ORDER — OXYBUTYNIN CHLORIDE 5 MG/1
TABLET ORAL
COMMUNITY
End: 2019-11-11

## 2019-08-01 RX ADMIN — REGADENOSON 0.4 MG: 0.08 INJECTION, SOLUTION INTRAVENOUS at 10:10

## 2019-08-01 RX ADMIN — TECHNETIUM TC 99M SESTAMIBI 1 DOSE: 1 INJECTION INTRAVENOUS at 08:05

## 2019-08-01 RX ADMIN — TECHNETIUM TC 99M SESTAMIBI 1 DOSE: 1 INJECTION INTRAVENOUS at 10:10

## 2019-08-02 LAB
AORTIC DIMENSIONLESS INDEX: 0.9 (DI)
BH CV ECHO MEAS - AO MAX PG (FULL): 1.2 MMHG
BH CV ECHO MEAS - AO MAX PG: 5.3 MMHG
BH CV ECHO MEAS - AO MEAN PG (FULL): 1 MMHG
BH CV ECHO MEAS - AO MEAN PG: 3 MMHG
BH CV ECHO MEAS - AO ROOT AREA (BSA CORRECTED): 1.6
BH CV ECHO MEAS - AO ROOT AREA: 8 CM^2
BH CV ECHO MEAS - AO ROOT DIAM: 3.2 CM
BH CV ECHO MEAS - AO V2 MAX: 115 CM/SEC
BH CV ECHO MEAS - AO V2 MEAN: 82.8 CM/SEC
BH CV ECHO MEAS - AO V2 VTI: 23.5 CM
BH CV ECHO MEAS - AVA(I,A): 2.8 CM^2
BH CV ECHO MEAS - AVA(I,D): 2.8 CM^2
BH CV ECHO MEAS - AVA(V,A): 2.8 CM^2
BH CV ECHO MEAS - AVA(V,D): 2.8 CM^2
BH CV ECHO MEAS - BSA(HAYCOCK): 2.1 M^2
BH CV ECHO MEAS - BSA: 2 M^2
BH CV ECHO MEAS - BZI_BMI: 46.3 KILOGRAMS/M^2
BH CV ECHO MEAS - BZI_METRIC_HEIGHT: 149.9 CM
BH CV ECHO MEAS - BZI_METRIC_WEIGHT: 103.9 KG
BH CV ECHO MEAS - EDV(CUBED): 91.1 ML
BH CV ECHO MEAS - EDV(MOD-SP2): 67 ML
BH CV ECHO MEAS - EDV(MOD-SP4): 65 ML
BH CV ECHO MEAS - EDV(TEICH): 92.4 ML
BH CV ECHO MEAS - EF(CUBED): 67.3 %
BH CV ECHO MEAS - EF(MOD-BP): 64 %
BH CV ECHO MEAS - EF(MOD-SP2): 65.7 %
BH CV ECHO MEAS - EF(MOD-SP4): 61.5 %
BH CV ECHO MEAS - EF(TEICH): 59 %
BH CV ECHO MEAS - ESV(CUBED): 29.8 ML
BH CV ECHO MEAS - ESV(MOD-SP2): 23 ML
BH CV ECHO MEAS - ESV(MOD-SP4): 25 ML
BH CV ECHO MEAS - ESV(TEICH): 37.9 ML
BH CV ECHO MEAS - FS: 31.1 %
BH CV ECHO MEAS - IVS/LVPW: 1.3
BH CV ECHO MEAS - IVSD: 1 CM
BH CV ECHO MEAS - LAT PEAK E' VEL: 9 CM/SEC
BH CV ECHO MEAS - LV DIASTOLIC VOL/BSA (35-75): 33.3 ML/M^2
BH CV ECHO MEAS - LV MASS(C)D: 132.8 GRAMS
BH CV ECHO MEAS - LV MASS(C)DI: 68 GRAMS/M^2
BH CV ECHO MEAS - LV MAX PG: 4.1 MMHG
BH CV ECHO MEAS - LV MEAN PG: 2 MMHG
BH CV ECHO MEAS - LV SYSTOLIC VOL/BSA (12-30): 12.8 ML/M^2
BH CV ECHO MEAS - LV V1 MAX: 101 CM/SEC
BH CV ECHO MEAS - LV V1 MEAN: 70.7 CM/SEC
BH CV ECHO MEAS - LV V1 VTI: 21 CM
BH CV ECHO MEAS - LVIDD: 4.5 CM
BH CV ECHO MEAS - LVIDS: 3.1 CM
BH CV ECHO MEAS - LVLD AP2: 7 CM
BH CV ECHO MEAS - LVLD AP4: 7.1 CM
BH CV ECHO MEAS - LVLS AP2: 5.8 CM
BH CV ECHO MEAS - LVLS AP4: 5.9 CM
BH CV ECHO MEAS - LVOT AREA (M): 3.1 CM^2
BH CV ECHO MEAS - LVOT AREA: 3.1 CM^2
BH CV ECHO MEAS - LVOT DIAM: 2 CM
BH CV ECHO MEAS - LVPWD: 0.8 CM
BH CV ECHO MEAS - MED PEAK E' VEL: 8 CM/SEC
BH CV ECHO MEAS - MV A DUR: 0.12 SEC
BH CV ECHO MEAS - MV A MAX VEL: 99.9 CM/SEC
BH CV ECHO MEAS - MV DEC SLOPE: 251 CM/SEC^2
BH CV ECHO MEAS - MV DEC TIME: 250 SEC
BH CV ECHO MEAS - MV E MAX VEL: 69.8 CM/SEC
BH CV ECHO MEAS - MV E/A: 0.7
BH CV ECHO MEAS - MV MAX PG: 4 MMHG
BH CV ECHO MEAS - MV MEAN PG: 1 MMHG
BH CV ECHO MEAS - MV P1/2T MAX VEL: 73.3 CM/SEC
BH CV ECHO MEAS - MV P1/2T: 85.5 MSEC
BH CV ECHO MEAS - MV V2 MAX: 100 CM/SEC
BH CV ECHO MEAS - MV V2 MEAN: 54.3 CM/SEC
BH CV ECHO MEAS - MV V2 VTI: 26.1 CM
BH CV ECHO MEAS - MVA P1/2T LCG: 3 CM^2
BH CV ECHO MEAS - MVA(P1/2T): 2.6 CM^2
BH CV ECHO MEAS - MVA(VTI): 2.5 CM^2
BH CV ECHO MEAS - PA ACC TIME: 0.09 SEC
BH CV ECHO MEAS - PA MAX PG (FULL): 1.5 MMHG
BH CV ECHO MEAS - PA MAX PG: 3 MMHG
BH CV ECHO MEAS - PA PR(ACCEL): 39.4 MMHG
BH CV ECHO MEAS - PA V2 MAX: 86.9 CM/SEC
BH CV ECHO MEAS - RAP SYSTOLE: 3 MMHG
BH CV ECHO MEAS - RV MAX PG: 1.5 MMHG
BH CV ECHO MEAS - RV MEAN PG: 1 MMHG
BH CV ECHO MEAS - RV V1 MAX: 62.1 CM/SEC
BH CV ECHO MEAS - RV V1 MEAN: 47.6 CM/SEC
BH CV ECHO MEAS - RV V1 VTI: 14 CM
BH CV ECHO MEAS - SI(AO): 96.8 ML/M^2
BH CV ECHO MEAS - SI(CUBED): 31.4 ML/M^2
BH CV ECHO MEAS - SI(LVOT): 33.8 ML/M^2
BH CV ECHO MEAS - SI(MOD-SP2): 22.5 ML/M^2
BH CV ECHO MEAS - SI(MOD-SP4): 20.5 ML/M^2
BH CV ECHO MEAS - SI(TEICH): 27.9 ML/M^2
BH CV ECHO MEAS - SV(AO): 189 ML
BH CV ECHO MEAS - SV(CUBED): 61.3 ML
BH CV ECHO MEAS - SV(LVOT): 66 ML
BH CV ECHO MEAS - SV(MOD-SP2): 44 ML
BH CV ECHO MEAS - SV(MOD-SP4): 40 ML
BH CV ECHO MEAS - SV(TEICH): 54.5 ML
BH CV ECHO MEAS - TAPSE (>1.6): 2 CM2
BH CV ECHO MEASUREMENTS AVERAGE E/E' RATIO: 8.21
BH CV VAS BP RIGHT ARM: NORMAL MMHG
BH CV XLRA - RV BASE: 3 CM
BH CV XLRA - TDI S': 8 CM/SEC
LEFT ATRIUM VOLUME INDEX: 16 ML/M2

## 2019-08-06 ENCOUNTER — TELEPHONE (OUTPATIENT)
Dept: CARDIOLOGY | Facility: CLINIC | Age: 69
End: 2019-08-06

## 2019-08-06 NOTE — TELEPHONE ENCOUNTER
"----- Message from Mitra Gavin sent at 8/5/2019  8:55 AM EDT -----  Regarding: Issues after Stress Test  Contact: [phone]  Ever since her Stress Test on Thursday it feels like she has a \"baseball sitting on her chest\"  "

## 2019-08-06 NOTE — TELEPHONE ENCOUNTER
S/w pt gave results to stress test. Pt has follow up on 8/8 with Dr Bello   Instructed pt if symptoms get worse should be seen in ER    Pt verbalized understanding

## 2019-08-08 ENCOUNTER — OFFICE VISIT (OUTPATIENT)
Dept: CARDIOLOGY | Facility: CLINIC | Age: 69
End: 2019-08-08

## 2019-08-08 VITALS
BODY MASS INDEX: 46.16 KG/M2 | HEIGHT: 59 IN | DIASTOLIC BLOOD PRESSURE: 94 MMHG | SYSTOLIC BLOOD PRESSURE: 146 MMHG | HEART RATE: 81 BPM | WEIGHT: 229 LBS

## 2019-08-08 DIAGNOSIS — R07.2 PRECORDIAL PAIN: Primary | ICD-10-CM

## 2019-08-08 DIAGNOSIS — I25.10 CORONARY ARTERY DISEASE INVOLVING NATIVE CORONARY ARTERY OF NATIVE HEART WITHOUT ANGINA PECTORIS: ICD-10-CM

## 2019-08-08 DIAGNOSIS — I10 ESSENTIAL HYPERTENSION: ICD-10-CM

## 2019-08-08 PROCEDURE — 99213 OFFICE O/P EST LOW 20 MIN: CPT | Performed by: INTERNAL MEDICINE

## 2019-08-08 RX ORDER — VENLAFAXINE HYDROCHLORIDE 75 MG/1
CAPSULE, EXTENDED RELEASE ORAL
COMMUNITY
End: 2020-10-06 | Stop reason: ALTCHOICE

## 2019-08-08 RX ORDER — NITROGLYCERIN 0.4 MG/1
0.4 TABLET SUBLINGUAL
Qty: 25 TABLET | Refills: 3 | Status: SHIPPED | OUTPATIENT
Start: 2019-08-08

## 2019-08-08 NOTE — PROGRESS NOTES
Subjective:        Yue Joe is a 69 y.o. female who here for follow up    CC  Chest pain follow-up  HPI  69-year-old female with known history of coronary artery disease, benign essential arterial hypertension here for the follow-up after the stress test and echocardiogram still have chest pain     Problem List Items Addressed This Visit        Cardiovascular and Mediastinum    Essential hypertension    Coronary artery disease involving native coronary artery of native heart without angina pectoris    Relevant Medications    nitroglycerin (NITROSTAT) 0.4 MG SL tablet       Nervous and Auditory    Precordial pain - Primary        .Interpretation Summary        · Patient BMI is 46.3 kg/m2..  · Findings consistent with an equivocal ECG stress test.  · Left ventricular ejection fraction is normal (Calculated EF = 60%).  · Myocardial perfusion imaging indicates a normal myocardial perfusion study with no evidence of ischemia.  · Impressions are consistent with a low risk study.     Interpretation Summary     · Calculated EF = 64.0%.  · There is no evidence of pericardial effusion.         The following portions of the patient's history were reviewed and updated as appropriate: allergies, current medications, past family history, past medical history, past social history, past surgical history and problem list.    Past Medical History:   Diagnosis Date   • Breast cancer (CMS/HCC)    • Disease of thyroid gland    • Environmental allergies    • GERD (gastroesophageal reflux disease)    • Hyperlipidemia    • Hypertension    • Stroke (CMS/HCC)      reports that she quit smoking about 14 years ago. She started smoking about 49 years ago. She has a 15.00 pack-year smoking history. She has never used smokeless tobacco. She reports that she does not drink alcohol or use drugs.   Family History   Problem Relation Age of Onset   • Heart disease Mother    • Heart attack Mother    • Hypertension Mother    • Heart attack Father   "  • Heart disease Father    • Hypertension Father    • Heart attack Sister    • Hypertension Sister    • Hypertension Brother    • Heart attack Brother    • Heart attack Maternal Grandmother    • Hypertension Maternal Grandmother    • Hypertension Maternal Grandfather    • Heart attack Maternal Grandfather    • Heart attack Paternal Grandmother    • Hypertension Paternal Grandmother        Review of Systems  Constitutional: No wt loss, fever, fatigue  Gastrointestinal: No nausea, abdominal pain  Behavioral/Psych: No insomnia or anxiety   Cardiovascular chest pain  Objective:       Physical Exam  /94   Pulse 81   Ht 149.9 cm (59\")   Wt 104 kg (229 lb)   BMI 46.25 kg/m²   General appearance: No acute changes   Neck: Trachea midline; NECK, supple, no thyromegaly or lymphadenopathy   Lungs: Normal size and shape, normal breath sounds, equal distribution of air, no rales and rhonchi   CV: S1-S2 regular, no murmurs, no rub, no gallop   Abdomen: Soft, non-tender; no masses , no abnormal abdominal sounds   Extremities: No deformity , normal color , no peripheral edema   Skin: Normal temperature, turgor and texture; no rash, ulcers          Procedures      Echocardiogram:        Current Outpatient Medications:   •  aspirin 81 MG tablet, Take 1 tablet by mouth daily., Disp: 30 tablet, Rfl: 11  •  clopidogrel (PLAVIX) 75 MG tablet, Take 75 mg by mouth Daily., Disp: , Rfl:   •  lisinopril (PRINIVIL,ZESTRIL) 5 MG tablet, Take 5 mg by mouth Daily., Disp: , Rfl:   •  loratadine (CLARITIN) 10 MG tablet, Take 10 mg by mouth Daily., Disp: , Rfl:   •  metoprolol tartrate (LOPRESSOR) 25 MG tablet, Take 25 mg by mouth 2 (Two) Times a Day., Disp: , Rfl:   •  oxybutynin (DITROPAN) 5 MG tablet, oxybutynin chloride 5 mg tablet  TAKE 1 TABLET TWICE DAILY, Disp: , Rfl:   •  pantoprazole (PROTONIX) 40 MG EC tablet, Take 40 mg by mouth Daily., Disp: , Rfl:   •  pravastatin (PRAVACHOL) 20 MG tablet, pravastatin 20 mg tablet  TAKE 1 " TABLET AT BEDTIME, Disp: , Rfl:   •  venlafaxine (EFFEXOR) 75 MG tablet, Take 75 mg by mouth 2 (Two) Times a Day., Disp: , Rfl:   •  vitamin D (ERGOCALCIFEROL) 93043 UNITS capsule capsule, Take 50,000 Units by mouth 1 (one) time per week., Disp: , Rfl:   •  nitroglycerin (NITROSTAT) 0.4 MG SL tablet, Place 0.4 mg under the tongue Every 5 (Five) Minutes As Needed for Chest Pain. Take no more than 3 doses in 15 minutes., Disp: , Rfl:   •  venlafaxine XR (EFFEXOR-XR) 75 MG 24 hr capsule, venlafaxine ER 75 mg capsule,extended release 24 hr  Take 1 capsule every day by oral route for 30 days., Disp: , Rfl:    Assessment:        Patient Active Problem List   Diagnosis   • Ischemic chest pain   • Essential hypertension   • Obesity   • Unstable angina (CMS/HCC)   • Coronary artery disease involving native coronary artery of native heart without angina pectoris   • Precordial pain     Conclusion        · successful right and lt coronary angiogram as well as LV milligrams  · Distal left main 40%  · LAD proximal stent widely patent distal to the stent 50% stenosis minimum diffuse irregularity distally  · Nondominant circumflex with OM 40-50% stenosis  · Dominant RCA proximal 50% minimum diffuse irregularity distally  · Normal LV gram                  Plan:            ICD-10-CM ICD-9-CM   1. Precordial pain R07.2 786.51   2. Essential hypertension I10 401.9   3. Coronary artery disease involving native coronary artery of native heart without angina pectoris I25.10 414.01     1. Precordial pain  Work-up has been negative    2. Essential hypertension  Blood pressure controlled    3. Coronary artery disease involving native coronary artery of native heart without angina pectoris  Still have chest pain       STILL HAVE CP    Specificity and sensitivity of the stress test/ cardiac workup has been explained. Pt has been explained if  Symptoms continue please go to ER, and further w/p will be required.    Also explained this does not  rule out coronary artery disease or the future events, continue to emphasize on risk reductions for coronary artery disease    Pt also advised to contact PCP for other causes of symptoms      Prescriptions of the nitroglycerin and associated instructions has been given  Patient has been advised to use sublingual nitroglycerin for chest pain or equivalent symptoms, maximum of 3 with the 10 minutes interval.  If the symptoms persist patient has been advised to go to emergency room  Due to the risk of the hypotension patient has been advised to take the sublingual nitroglycerin while the patient in sitting position      SEE IN 3 MONTHS  COUNSELING:    Yue Farfan was given to patient for the following topics: diagnostic results, risk factor reductions, impressions, risks and benefits of treatment options and importance of treatment compliance .       SMOKING COUNSELING:    Counseling given: Not Answered  Comment: QUIT 30 YEARS AGO      Dictated using Dragon dictation

## 2019-10-28 DIAGNOSIS — K21.9 GASTROESOPHAGEAL REFLUX DISEASE, ESOPHAGITIS PRESENCE NOT SPECIFIED: Primary | ICD-10-CM

## 2019-11-11 ENCOUNTER — OFFICE VISIT (OUTPATIENT)
Dept: CARDIOLOGY | Facility: CLINIC | Age: 69
End: 2019-11-11

## 2019-11-11 VITALS
SYSTOLIC BLOOD PRESSURE: 165 MMHG | DIASTOLIC BLOOD PRESSURE: 91 MMHG | WEIGHT: 229 LBS | HEART RATE: 90 BPM | HEIGHT: 59 IN | BODY MASS INDEX: 46.16 KG/M2

## 2019-11-11 DIAGNOSIS — I10 ESSENTIAL HYPERTENSION: ICD-10-CM

## 2019-11-11 DIAGNOSIS — I25.10 CORONARY ARTERY DISEASE INVOLVING NATIVE CORONARY ARTERY OF NATIVE HEART WITHOUT ANGINA PECTORIS: Primary | ICD-10-CM

## 2019-11-11 DIAGNOSIS — R07.2 PRECORDIAL PAIN: ICD-10-CM

## 2019-11-11 PROCEDURE — 99213 OFFICE O/P EST LOW 20 MIN: CPT | Performed by: INTERNAL MEDICINE

## 2019-11-11 NOTE — PROGRESS NOTES
Subjective:        Yue Joe is a 69 y.o. female who here for follow up    CC  Still have feeling hot, gi w/p pending  HPI  69-year-old female with known history of the coronary artery disease, benign essential arterial hypertension as well as precordial chest pain here for the follow-up as the patient is still feeling hot, patient also is going to the GI work-up which is pending     Problem List Items Addressed This Visit        Cardiovascular and Mediastinum    Essential hypertension    Coronary artery disease involving native coronary artery of native heart without angina pectoris - Primary       Nervous and Auditory    Precordial pain        .    The following portions of the patient's history were reviewed and updated as appropriate: allergies, current medications, past family history, past medical history, past social history, past surgical history and problem list.    Past Medical History:   Diagnosis Date   • Breast cancer (CMS/HCC)    • Disease of thyroid gland    • Environmental allergies    • GERD (gastroesophageal reflux disease)    • Hyperlipidemia    • Hypertension    • Stroke (CMS/HCC)      reports that she quit smoking about 14 years ago. She started smoking about 49 years ago. She has a 15.00 pack-year smoking history. She has never used smokeless tobacco. She reports that she does not drink alcohol or use drugs.   Family History   Problem Relation Age of Onset   • Heart disease Mother    • Heart attack Mother    • Hypertension Mother    • Heart attack Father    • Heart disease Father    • Hypertension Father    • Heart attack Sister    • Hypertension Sister    • Hypertension Brother    • Heart attack Brother    • Heart attack Maternal Grandmother    • Hypertension Maternal Grandmother    • Hypertension Maternal Grandfather    • Heart attack Maternal Grandfather    • Heart attack Paternal Grandmother    • Hypertension Paternal Grandmother        Review of Systems  Constitutional: No wt loss,  "fever, fatigue  Gastrointestinal: No nausea, abdominal pain  Behavioral/Psych: No insomnia or anxiety   Cardiovascular no chest pains or tightness no chest  Objective:       Physical Exam  /91 (BP Location: Right arm, Patient Position: Sitting)   Pulse 90   Ht 149.9 cm (59\")   Wt 104 kg (229 lb)   BMI 46.25 kg/m²   General appearance: No acute changes   Neck: Trachea midline; NECK, supple, no thyromegaly or lymphadenopathy   Lungs: Normal size and shape, normal breath sounds, equal distribution of air, no rales and rhonchi   CV: S1-S2 regular, no murmurs, no rub, no gallop   Abdomen: Soft, non-tender; no masses , no abnormal abdominal sounds   Extremities: No deformity , normal color , no peripheral edema   Skin: Normal temperature, turgor and texture; no rash, ulcers          Procedures      Echocardiogram:        Current Outpatient Medications:   •  aspirin 81 MG tablet, Take 1 tablet by mouth daily., Disp: 30 tablet, Rfl: 11  •  clopidogrel (PLAVIX) 75 MG tablet, Take 75 mg by mouth Daily., Disp: , Rfl:   •  lisinopril (PRINIVIL,ZESTRIL) 5 MG tablet, Take 5 mg by mouth Daily., Disp: , Rfl:   •  loratadine (CLARITIN) 10 MG tablet, Take 10 mg by mouth Daily., Disp: , Rfl:   •  metoprolol tartrate (LOPRESSOR) 25 MG tablet, Take 25 mg by mouth 2 (Two) Times a Day., Disp: , Rfl:   •  nitroglycerin (NITROSTAT) 0.4 MG SL tablet, Place 1 tablet under the tongue Every 5 (Five) Minutes As Needed for Chest Pain. Take no more than 3 doses in 15 minutes., Disp: 25 tablet, Rfl: 3  •  pantoprazole (PROTONIX) 40 MG EC tablet, Take 40 mg by mouth Daily., Disp: , Rfl:   •  pravastatin (PRAVACHOL) 20 MG tablet, pravastatin 20 mg tablet  TAKE 1 TABLET AT BEDTIME, Disp: , Rfl:   •  venlafaxine XR (EFFEXOR-XR) 75 MG 24 hr capsule, venlafaxine ER 75 mg capsule,extended release 24 hr  Take 1 capsule every day by oral route for 30 days., Disp: , Rfl:   •  vitamin D (ERGOCALCIFEROL) 72937 UNITS capsule capsule, Take 50,000 Units " by mouth 1 (one) time per week., Disp: , Rfl:    Assessment:        Patient Active Problem List   Diagnosis   • Ischemic chest pain (CMS/HCC)   • Essential hypertension   • Obesity   • Unstable angina (CMS/HCC)   • Coronary artery disease involving native coronary artery of native heart without angina pectoris   • Precordial pain               Plan:            ICD-10-CM ICD-9-CM   1. Coronary artery disease involving native coronary artery of native heart without angina pectoris I25.10 414.01   2. Essential hypertension I10 401.9   3. Precordial pain R07.2 786.51     1. Coronary artery disease involving native coronary artery of native heart without angina pectoris  Yuesalazar Joe with coronary artery disease has no angina pectoris    Risk reduction for the coronary artery disease, controlling the blood pressure, blood sugar management, cholesterol management, exercise, stress management, and proper compliance with medications and follow-up has been discussed      2. Essential hypertension  Blood pressure controlled    3. Precordial pain  Atypical       cv stable    See in 1 yr  COUNSELING:    Yue Farfan was given to patient for the following topics: diagnostic results, risk factor reductions, impressions, risks and benefits of treatment options and importance of treatment compliance .       SMOKING COUNSELING:    [unfilled]    Dictated using Dragon dictation

## 2019-12-23 ENCOUNTER — OFFICE VISIT (OUTPATIENT)
Dept: GASTROENTEROLOGY | Facility: CLINIC | Age: 69
End: 2019-12-23

## 2019-12-23 VITALS
BODY MASS INDEX: 39.92 KG/M2 | DIASTOLIC BLOOD PRESSURE: 86 MMHG | WEIGHT: 198 LBS | TEMPERATURE: 97.7 F | HEIGHT: 59 IN | SYSTOLIC BLOOD PRESSURE: 144 MMHG

## 2019-12-23 DIAGNOSIS — E66.01 MORBID OBESITY DUE TO EXCESS CALORIES (HCC): ICD-10-CM

## 2019-12-23 DIAGNOSIS — R11.2 NAUSEA AND VOMITING IN ADULT: Primary | ICD-10-CM

## 2019-12-23 DIAGNOSIS — K21.9 GASTROESOPHAGEAL REFLUX DISEASE, ESOPHAGITIS PRESENCE NOT SPECIFIED: ICD-10-CM

## 2019-12-23 LAB
ALBUMIN SERPL-MCNC: 4.2 G/DL (ref 3.5–5.2)
ALBUMIN/GLOB SERPL: 1.8 G/DL
ALP SERPL-CCNC: 82 U/L (ref 39–117)
ALT SERPL-CCNC: 22 U/L (ref 1–33)
AST SERPL-CCNC: 15 U/L (ref 1–32)
BASOPHILS # BLD AUTO: 0.05 10*3/MM3 (ref 0–0.2)
BASOPHILS NFR BLD AUTO: 0.8 % (ref 0–1.5)
BILIRUB SERPL-MCNC: 0.3 MG/DL (ref 0.2–1.2)
BUN SERPL-MCNC: 12 MG/DL (ref 8–23)
BUN/CREAT SERPL: 14 (ref 7–25)
CALCIUM SERPL-MCNC: 9.5 MG/DL (ref 8.6–10.5)
CHLORIDE SERPL-SCNC: 102 MMOL/L (ref 98–107)
CO2 SERPL-SCNC: 26.3 MMOL/L (ref 22–29)
CREAT SERPL-MCNC: 0.86 MG/DL (ref 0.57–1)
EOSINOPHIL # BLD AUTO: 0.16 10*3/MM3 (ref 0–0.4)
EOSINOPHIL NFR BLD AUTO: 2.5 % (ref 0.3–6.2)
ERYTHROCYTE [DISTWIDTH] IN BLOOD BY AUTOMATED COUNT: 12.6 % (ref 12.3–15.4)
GLOBULIN SER CALC-MCNC: 2.4 GM/DL
GLUCOSE SERPL-MCNC: 204 MG/DL (ref 65–99)
HCT VFR BLD AUTO: 45.1 % (ref 34–46.6)
HGB BLD-MCNC: 15.5 G/DL (ref 12–15.9)
IMM GRANULOCYTES # BLD AUTO: 0.02 10*3/MM3 (ref 0–0.05)
IMM GRANULOCYTES NFR BLD AUTO: 0.3 % (ref 0–0.5)
LYMPHOCYTES # BLD AUTO: 2.23 10*3/MM3 (ref 0.7–3.1)
LYMPHOCYTES NFR BLD AUTO: 35.3 % (ref 19.6–45.3)
MCH RBC QN AUTO: 29.8 PG (ref 26.6–33)
MCHC RBC AUTO-ENTMCNC: 34.4 G/DL (ref 31.5–35.7)
MCV RBC AUTO: 86.7 FL (ref 79–97)
MONOCYTES # BLD AUTO: 0.5 10*3/MM3 (ref 0.1–0.9)
MONOCYTES NFR BLD AUTO: 7.9 % (ref 5–12)
NEUTROPHILS # BLD AUTO: 3.35 10*3/MM3 (ref 1.7–7)
NEUTROPHILS NFR BLD AUTO: 53.2 % (ref 42.7–76)
NRBC BLD AUTO-RTO: 0 /100 WBC (ref 0–0.2)
PLATELET # BLD AUTO: 225 10*3/MM3 (ref 140–450)
POTASSIUM SERPL-SCNC: 4.9 MMOL/L (ref 3.5–5.2)
PROT SERPL-MCNC: 6.6 G/DL (ref 6–8.5)
RBC # BLD AUTO: 5.2 10*6/MM3 (ref 3.77–5.28)
SODIUM SERPL-SCNC: 141 MMOL/L (ref 136–145)
WBC # BLD AUTO: 6.31 10*3/MM3 (ref 3.4–10.8)

## 2019-12-23 PROCEDURE — 99204 OFFICE O/P NEW MOD 45 MIN: CPT | Performed by: INTERNAL MEDICINE

## 2019-12-23 RX ORDER — SODIUM CHLORIDE, SODIUM LACTATE, POTASSIUM CHLORIDE, CALCIUM CHLORIDE 600; 310; 30; 20 MG/100ML; MG/100ML; MG/100ML; MG/100ML
30 INJECTION, SOLUTION INTRAVENOUS CONTINUOUS
Status: CANCELLED | OUTPATIENT
Start: 2020-01-29

## 2019-12-23 RX ORDER — LISINOPRIL 10 MG/1
TABLET ORAL
COMMUNITY
Start: 2019-12-10 | End: 2020-10-06 | Stop reason: ALTCHOICE

## 2019-12-23 RX ORDER — ONDANSETRON 4 MG/1
4 TABLET, FILM COATED ORAL EVERY 12 HOURS PRN
Qty: 60 TABLET | Refills: 1 | Status: SHIPPED | OUTPATIENT
Start: 2019-12-23 | End: 2020-08-25

## 2019-12-23 NOTE — PROGRESS NOTES
"Chief Complaint   Patient presents with   • Nausea   • Vomiting       Subjective     HPI    Yue Joe is a 69 y.o. female with a past medical history noted below who presents for evaluation of nausea, vomiting, and GERD.  She says that she \"stays sick\" all the time.  Symptoms present for about 2 years.  Describes episodes occurring up to 5 time per week.  She will start to get sweaty, nauseated.  Vomits about 1 time per week, mostly looks like phlegm.  Better with laying down, sleeping.  She is wiped out for most of the day.  She does eat because she feels better with eating but not much.  No associated diarrhea.  She has no nausea medicine.  She denies that she has lost weight though there is some discrepancy between her cardiologist scale and hours.    She does have a history of coronary artery disease.  She has had stents.  She is on Plavix.  She reports this is stable.    She is on pantoprazole but no difference in symptoms.  She does have GERD described as bubbling sensation in her esophagus.      Colonoscopy last year, melanosis, no polyps.  Personally reviewed the op note from Dr. Cheatham in the [facility] epic system.    No current smoking, no ETOH    S/p rachel    Hx of breast CA    No family history of GI malignancies.        Past Medical History:   Diagnosis Date   • Breast cancer (CMS/HCC)    • Disease of thyroid gland    • Environmental allergies    • GERD (gastroesophageal reflux disease)    • Hyperlipidemia    • Hypertension    • Stroke (CMS/HCC)          Current Outpatient Medications:   •  aspirin 81 MG tablet, Take 1 tablet by mouth daily., Disp: 30 tablet, Rfl: 11  •  clopidogrel (PLAVIX) 75 MG tablet, Take 75 mg by mouth Daily., Disp: , Rfl:   •  lisinopril (PRINIVIL,ZESTRIL) 10 MG tablet, , Disp: , Rfl:   •  lisinopril (PRINIVIL,ZESTRIL) 5 MG tablet, Take 5 mg by mouth Daily., Disp: , Rfl:   •  loratadine (CLARITIN) 10 MG tablet, Take 10 mg by mouth Daily., Disp: , Rfl:   •  metoprolol tartrate " (LOPRESSOR) 25 MG tablet, Take 25 mg by mouth 2 (Two) Times a Day., Disp: , Rfl:   •  nitroglycerin (NITROSTAT) 0.4 MG SL tablet, Place 1 tablet under the tongue Every 5 (Five) Minutes As Needed for Chest Pain. Take no more than 3 doses in 15 minutes., Disp: 25 tablet, Rfl: 3  •  pantoprazole (PROTONIX) 40 MG EC tablet, Take 40 mg by mouth Daily., Disp: , Rfl:   •  pravastatin (PRAVACHOL) 20 MG tablet, pravastatin 20 mg tablet  TAKE 1 TABLET AT BEDTIME, Disp: , Rfl:   •  venlafaxine XR (EFFEXOR-XR) 75 MG 24 hr capsule, venlafaxine ER 75 mg capsule,extended release 24 hr  Take 1 capsule every day by oral route for 30 days., Disp: , Rfl:   •  vitamin D (ERGOCALCIFEROL) 15910 UNITS capsule capsule, Take 50,000 Units by mouth 1 (one) time per week., Disp: , Rfl:   •  ondansetron (ZOFRAN) 4 MG tablet, Take 1 tablet by mouth Every 12 (Twelve) Hours As Needed for Nausea or Vomiting., Disp: 60 tablet, Rfl: 1    Allergies   Allergen Reactions   • Latex Other (See Comments)     Skin tears       Social History     Socioeconomic History   • Marital status:      Spouse name: Not on file   • Number of children: Not on file   • Years of education: Not on file   • Highest education level: Not on file   Tobacco Use   • Smoking status: Former Smoker     Packs/day: 1.00     Years: 15.00     Pack years: 15.00     Start date: 1970     Last attempt to quit: 2005     Years since quittin.4   • Smokeless tobacco: Never Used   • Tobacco comment: QUIT 30 YEARS AGO   Substance and Sexual Activity   • Alcohol use: No   • Drug use: No   • Sexual activity: Defer     Birth control/protection: Post-menopausal       Family History   Problem Relation Age of Onset   • Heart disease Mother    • Heart attack Mother    • Hypertension Mother    • Heart attack Father    • Heart disease Father    • Hypertension Father    • Heart attack Sister    • Hypertension Sister    • Hypertension Brother    • Heart attack Brother    • Heart attack  Maternal Grandmother    • Hypertension Maternal Grandmother    • Hypertension Maternal Grandfather    • Heart attack Maternal Grandfather    • Heart attack Paternal Grandmother    • Hypertension Paternal Grandmother        Review of Systems   Constitutional: Negative for activity change, appetite change and fatigue.   HENT: Negative for sore throat and trouble swallowing.    Respiratory: Negative.    Cardiovascular: Negative.    Gastrointestinal: Positive for nausea and vomiting. Negative for abdominal distention, abdominal pain and blood in stool.   Endocrine: Negative for cold intolerance and heat intolerance.   Genitourinary: Negative for difficulty urinating, dysuria and frequency.   Musculoskeletal: Negative for arthralgias, back pain and myalgias.   Skin: Negative.    Hematological: Negative for adenopathy. Does not bruise/bleed easily.   All other systems reviewed and are negative.      Objective     Vitals:    12/23/19 1048   BP: 144/86   Temp: 97.7 °F (36.5 °C)         12/23/19  1048   Weight: 89.8 kg (198 lb)     Body mass index is 39.99 kg/m².    Physical Exam   Constitutional: She is oriented to person, place, and time. She appears well-developed and well-nourished. No distress.   Obese   HENT:   Head: Normocephalic and atraumatic.   Right Ear: External ear normal.   Left Ear: External ear normal.   Nose: Nose normal.   Mouth/Throat: Oropharynx is clear and moist.   Eyes: Conjunctivae and EOM are normal. Right eye exhibits no discharge. Left eye exhibits no discharge. No scleral icterus.   Neck: Normal range of motion. Neck supple. No thyromegaly present.   No supraclavicular adenopathy   Cardiovascular: Normal rate, regular rhythm, normal heart sounds and intact distal pulses. Exam reveals no gallop.   No murmur heard.  No lower extremity edema   Pulmonary/Chest: Effort normal and breath sounds normal. No respiratory distress. She has no wheezes.   Abdominal: Soft. Normal appearance and bowel sounds are  normal. She exhibits no distension and no mass. There is no hepatosplenomegaly. There is no tenderness. There is no rigidity, no rebound and no guarding. No hernia.   Prominent central obesity   Genitourinary:   Genitourinary Comments: Rectal exam deferred   Musculoskeletal: Normal range of motion. She exhibits no edema or tenderness.   No atrophy of upper or lower extremities.  Normal digits and nails of both hands.   Lymphadenopathy:     She has no cervical adenopathy.   Neurological: She is alert and oriented to person, place, and time. She displays no atrophy. Coordination normal.   Skin: Skin is warm and dry. No rash noted. She is not diaphoretic. No erythema.   Psychiatric: She has a normal mood and affect. Her behavior is normal. Judgment and thought content normal.   Vitals reviewed.      WBC   Date Value Ref Range Status   07/19/2017 7.36 4.50 - 10.70 10*3/mm3 Final     RBC   Date Value Ref Range Status   07/19/2017 5.32 (H) 3.90 - 5.20 10*6/mm3 Final     Hemoglobin   Date Value Ref Range Status   07/19/2017 16.0 (H) 11.9 - 15.5 g/dL Final     Hematocrit   Date Value Ref Range Status   07/19/2017 49.0 (H) 35.6 - 45.5 % Final     MCV   Date Value Ref Range Status   07/19/2017 92.1 80.5 - 98.2 fL Final     MCH   Date Value Ref Range Status   07/19/2017 30.1 26.9 - 32.0 pg Final     MCHC   Date Value Ref Range Status   07/19/2017 32.7 32.4 - 36.3 g/dL Final     RDW   Date Value Ref Range Status   07/19/2017 13.0 11.7 - 13.0 % Final     RDW-SD   Date Value Ref Range Status   07/19/2017 43.5 37.0 - 54.0 fl Final     MPV   Date Value Ref Range Status   07/19/2017 10.4 6.0 - 12.0 fL Final     Platelets   Date Value Ref Range Status   07/19/2017 241 140 - 500 10*3/mm3 Final     Neutrophil %   Date Value Ref Range Status   07/19/2017 52.5 42.7 - 76.0 % Final     Lymphocyte %   Date Value Ref Range Status   07/19/2017 38.2 19.6 - 45.3 % Final     Monocyte %   Date Value Ref Range Status   07/19/2017 6.3 5.0 - 12.0 %  Final     Eosinophil %   Date Value Ref Range Status   07/19/2017 1.9 0.3 - 6.2 % Final     Basophil %   Date Value Ref Range Status   07/19/2017 0.7 0.0 - 1.5 % Final     Immature Grans %   Date Value Ref Range Status   07/19/2017 0.4 0.0 - 0.5 % Final     Neutrophils, Absolute   Date Value Ref Range Status   07/19/2017 3.87 1.90 - 8.10 10*3/mm3 Final     Lymphocytes, Absolute   Date Value Ref Range Status   07/19/2017 2.81 0.90 - 4.80 10*3/mm3 Final     Monocytes, Absolute   Date Value Ref Range Status   07/19/2017 0.46 0.20 - 1.20 10*3/mm3 Final     Eosinophils, Absolute   Date Value Ref Range Status   07/19/2017 0.14 0.00 - 0.70 10*3/mm3 Final     Basophils, Absolute   Date Value Ref Range Status   07/19/2017 0.05 0.00 - 0.20 10*3/mm3 Final     Immature Grans, Absolute   Date Value Ref Range Status   07/19/2017 0.03 0.00 - 0.03 10*3/mm3 Final       Glucose   Date Value Ref Range Status   07/19/2017 106 (H) 65 - 99 mg/dL Final     Sodium   Date Value Ref Range Status   07/19/2017 140 136 - 145 mmol/L Final     Potassium   Date Value Ref Range Status   07/19/2017 4.5 3.5 - 5.2 mmol/L Final     CO2   Date Value Ref Range Status   07/19/2017 26.8 22.0 - 29.0 mmol/L Final     Chloride   Date Value Ref Range Status   07/19/2017 102 98 - 107 mmol/L Final     Anion Gap   Date Value Ref Range Status   07/19/2017 11.2 mmol/L Final     Creatinine   Date Value Ref Range Status   07/19/2017 0.90 0.57 - 1.00 mg/dL Final     BUN   Date Value Ref Range Status   07/19/2017 14 8 - 23 mg/dL Final     BUN/Creatinine Ratio   Date Value Ref Range Status   07/19/2017 15.6 7.0 - 25.0 Final     Calcium   Date Value Ref Range Status   07/19/2017 10.0 8.6 - 10.5 mg/dL Final     eGFR Non  Amer   Date Value Ref Range Status   07/19/2017 63 >60 mL/min/1.73 Final     Alkaline Phosphatase   Date Value Ref Range Status   07/27/2016 80 39 - 117 U/L Final     Total Protein   Date Value Ref Range Status   07/27/2016 6.7 6.0 - 8.5 g/dL Final      ALT (SGPT)   Date Value Ref Range Status   07/27/2016 25 1 - 33 U/L Final     AST (SGOT)   Date Value Ref Range Status   07/27/2016 24 1 - 32 U/L Final     Total Bilirubin   Date Value Ref Range Status   07/27/2016 0.3 0.1 - 1.2 mg/dL Final     Albumin   Date Value Ref Range Status   07/27/2016 4.00 3.50 - 5.20 g/dL Final     Globulin   Date Value Ref Range Status   07/27/2016 2.7 gm/dL Final         Imaging Results (Last 7 Days)     ** No results found for the last 168 hours. **            No notes on file    Assessment/Plan    Nausea and vomiting: Unclear etiology.  She is not a diabetic.  Possibly related to reflux/dyspepsia    GERD: She has been on pantoprazole for a long period of time, unclear relation of her symptoms to above    Morbid obesity    Plan  EGD for further evaluation.  I will keep her on the clopidogrel given her cardiac history  Continue pantoprazole for now  Start Zofran up to twice a day as needed for nausea, vomiting  Further recommendations after EGD, may warrant GES    Yeu was seen today for nausea and vomiting.    Diagnoses and all orders for this visit:    Nausea and vomiting in adult  -     CBC & Differential  -     Comprehensive Metabolic Panel  -     Case Request; Standing  -     Follow Anesthesia Guidelines / Standing Orders; Future  -     Obtain Informed Consent; Future  -     Case Request    Gastroesophageal reflux disease, esophagitis presence not specified  -     CBC & Differential  -     Comprehensive Metabolic Panel  -     Case Request; Standing  -     Follow Anesthesia Guidelines / Standing Orders; Future  -     Obtain Informed Consent; Future  -     Case Request    Morbid obesity due to excess calories (CMS/[facility])    Other orders  -     ondansetron (ZOFRAN) 4 MG tablet; Take 1 tablet by mouth Every 12 (Twelve) Hours As Needed for Nausea or Vomiting.        I have discussed the above plan with the patient.  They verbalize understanding and are in agreement with the plan.   They have been advised to contact the office for any questions, concerns, or changes related to their health.    Dictated utilizing Dragon dictation

## 2019-12-30 ENCOUNTER — TELEPHONE (OUTPATIENT)
Dept: GASTROENTEROLOGY | Facility: CLINIC | Age: 69
End: 2019-12-30

## 2019-12-30 NOTE — TELEPHONE ENCOUNTER
----- Message from Tami English MD sent at 12/30/2019  2:56 PM EST -----  All labs-- blood count, electrolytes, kidney function, liver function all normal.  Glucose was 204

## 2020-01-29 ENCOUNTER — ANESTHESIA (OUTPATIENT)
Dept: GASTROENTEROLOGY | Facility: HOSPITAL | Age: 70
End: 2020-01-29

## 2020-01-29 ENCOUNTER — ANESTHESIA EVENT (OUTPATIENT)
Dept: GASTROENTEROLOGY | Facility: HOSPITAL | Age: 70
End: 2020-01-29

## 2020-01-29 ENCOUNTER — HOSPITAL ENCOUNTER (OUTPATIENT)
Facility: HOSPITAL | Age: 70
Setting detail: HOSPITAL OUTPATIENT SURGERY
Discharge: HOME | End: 2020-01-29
Attending: INTERNAL MEDICINE | Admitting: INTERNAL MEDICINE

## 2020-01-29 VITALS
BODY MASS INDEX: 48.54 KG/M2 | HEIGHT: 58 IN | DIASTOLIC BLOOD PRESSURE: 90 MMHG | RESPIRATION RATE: 16 BRPM | WEIGHT: 231.25 LBS | OXYGEN SATURATION: 97 % | HEART RATE: 64 BPM | SYSTOLIC BLOOD PRESSURE: 143 MMHG

## 2020-01-29 DIAGNOSIS — K21.9 GASTROESOPHAGEAL REFLUX DISEASE, ESOPHAGITIS PRESENCE NOT SPECIFIED: ICD-10-CM

## 2020-01-29 DIAGNOSIS — R11.2 NAUSEA AND VOMITING IN ADULT: ICD-10-CM

## 2020-01-29 PROCEDURE — 43239 EGD BIOPSY SINGLE/MULTIPLE: CPT | Performed by: INTERNAL MEDICINE

## 2020-01-29 PROCEDURE — 88305 TISSUE EXAM BY PATHOLOGIST: CPT | Performed by: INTERNAL MEDICINE

## 2020-01-29 PROCEDURE — S0260 H&P FOR SURGERY: HCPCS | Performed by: INTERNAL MEDICINE

## 2020-01-29 PROCEDURE — 25010000002 PROPOFOL 10 MG/ML EMULSION: Performed by: NURSE ANESTHETIST, CERTIFIED REGISTERED

## 2020-01-29 RX ORDER — PROPOFOL 10 MG/ML
VIAL (ML) INTRAVENOUS AS NEEDED
Status: DISCONTINUED | OUTPATIENT
Start: 2020-01-29 | End: 2020-01-29 | Stop reason: SURG

## 2020-01-29 RX ORDER — PROPOFOL 10 MG/ML
VIAL (ML) INTRAVENOUS CONTINUOUS PRN
Status: DISCONTINUED | OUTPATIENT
Start: 2020-01-29 | End: 2020-01-29 | Stop reason: SURG

## 2020-01-29 RX ORDER — LIDOCAINE HYDROCHLORIDE 20 MG/ML
INJECTION, SOLUTION INFILTRATION; PERINEURAL AS NEEDED
Status: DISCONTINUED | OUTPATIENT
Start: 2020-01-29 | End: 2020-01-29 | Stop reason: SURG

## 2020-01-29 RX ORDER — SODIUM CHLORIDE, SODIUM LACTATE, POTASSIUM CHLORIDE, CALCIUM CHLORIDE 600; 310; 30; 20 MG/100ML; MG/100ML; MG/100ML; MG/100ML
30 INJECTION, SOLUTION INTRAVENOUS CONTINUOUS
Status: DISCONTINUED | OUTPATIENT
Start: 2020-01-29 | End: 2020-01-29 | Stop reason: HOSPADM

## 2020-01-29 RX ADMIN — PROPOFOL 100 MG: 10 INJECTION, EMULSION INTRAVENOUS at 11:16

## 2020-01-29 RX ADMIN — PROPOFOL 160 MCG/KG/MIN: 10 INJECTION, EMULSION INTRAVENOUS at 11:16

## 2020-01-29 RX ADMIN — SODIUM CHLORIDE, POTASSIUM CHLORIDE, SODIUM LACTATE AND CALCIUM CHLORIDE 30 ML/HR: 600; 310; 30; 20 INJECTION, SOLUTION INTRAVENOUS at 10:09

## 2020-01-29 RX ADMIN — LIDOCAINE HYDROCHLORIDE 100 MG: 20 INJECTION, SOLUTION INFILTRATION; PERINEURAL at 11:16

## 2020-01-29 NOTE — ANESTHESIA PREPROCEDURE EVALUATION
Anesthesia Evaluation     Patient summary reviewed and Nursing notes reviewed   no history of anesthetic complications:  NPO Solid Status: > 8 hours  NPO Liquid Status: > 8 hours           Airway   Mallampati: II  TM distance: >3 FB  Neck ROM: full  No difficulty expected  Dental - normal exam     Pulmonary - normal exam    breath sounds clear to auscultation  (+) shortness of breath,   Cardiovascular - normal exam    Rhythm: regular  Rate: normal    (+) hypertension, CAD, cardiac stents more than 12 months ago angina (unstable,, no recent NTG use), MONTES, hyperlipidemia,     ROS comment: Myocardial perfusion nl    Neuro/Psych  (+) CVA,     GI/Hepatic/Renal/Endo    (+) morbid obesity, GERD,  thyroid problem (remote history,, no meds)   (-)  obesity    Musculoskeletal (-) negative ROS    Abdominal   (+) obese,    Substance History - negative use     OB/GYN negative ob/gyn ROS         Other      history of cancer remission                    Anesthesia Plan    ASA 3     MAC     intravenous induction     Anesthetic plan, all risks, benefits, and alternatives have been provided, discussed and informed consent has been obtained with: patient.

## 2020-01-29 NOTE — ANESTHESIA POSTPROCEDURE EVALUATION
Patient: Yue Joe    Procedure Summary     Date:  01/29/20 Room / Location:   [facility]    Anesthesia Start:  1108 Anesthesia Stop:  1134    Procedure:  ESOPHAGOGASTRODUODENOSCOPY with biopsies (N/A Esophagus) Diagnosis:       Nausea and vomiting in adult      Gastroesophageal reflux disease, esophagitis presence not specified      (Nausea and vomiting in adult [R11.2])      (Gastroesophageal reflux disease, esophagitis presence not specified [K21.9])    Surgeon:  Tami English MD Provider:  Niya Mcmahon MD    Anesthesia Type:  MAC ASA Status:  3          Anesthesia Type: MAC    Vitals  Vitals Value Taken Time   /90 1/29/2020 11:52 AM   Temp     Pulse 64 1/29/2020 11:52 AM   Resp 16 1/29/2020 11:52 AM   SpO2 97 % 1/29/2020 11:52 AM           Post Anesthesia Care and Evaluation    Patient location during evaluation: PACU  Patient participation: complete - patient participated  Level of consciousness: awake and alert  Pain management: adequate  Airway patency: patent  Anesthetic complications: No anesthetic complications  PONV Status: none  Cardiovascular status: acceptable  Respiratory status: acceptable  Hydration status: acceptable

## 2020-01-30 ENCOUNTER — TELEPHONE (OUTPATIENT)
Dept: GASTROENTEROLOGY | Facility: CLINIC | Age: 70
End: 2020-01-30

## 2020-01-30 DIAGNOSIS — R11.2 NAUSEA AND VOMITING IN ADULT: ICD-10-CM

## 2020-01-30 DIAGNOSIS — K21.00 GASTROESOPHAGEAL REFLUX DISEASE WITH ESOPHAGITIS: Primary | ICD-10-CM

## 2020-01-30 LAB
CYTO UR: NORMAL
LAB AP CASE REPORT: NORMAL
PATH REPORT.FINAL DX SPEC: NORMAL
PATH REPORT.GROSS SPEC: NORMAL

## 2020-01-30 NOTE — TELEPHONE ENCOUNTER
Called pt's home number and number continues to ring.  Called pt's mobile number and left vm for her to call back.

## 2020-01-30 NOTE — TELEPHONE ENCOUNTER
----- Message from Tami English MD sent at 1/30/2020  3:51 PM EST -----  Biopsies from her EGD show mild gastritis, no H. pylori infection    No evidence of esophagitis    I have ordered a gastric emptying study    She needs to continue the Zofran as needed    Continue daily pantoprazole every morning before breakfast    No NSAIDs

## 2020-01-31 NOTE — TELEPHONE ENCOUNTER
Called pt and advised per Dr English that her bx of her egd showed mild gastritis, no h pylori. No evidence of esophagitis.  She has ordered a ges and someone from [facility] will call her to arrange.  She needs to continue the zofran as needed and continue the pantoprazole daily before breakfast and no nsaids. Pt verb understanding.

## 2020-02-27 ENCOUNTER — HOSPITAL ENCOUNTER (OUTPATIENT)
Dept: NUCLEAR MEDICINE | Facility: HOSPITAL | Age: 70
Discharge: HOME | End: 2020-02-27

## 2020-02-27 DIAGNOSIS — K21.00 GASTROESOPHAGEAL REFLUX DISEASE WITH ESOPHAGITIS: ICD-10-CM

## 2020-02-27 DIAGNOSIS — R11.2 NAUSEA AND VOMITING IN ADULT: ICD-10-CM

## 2020-03-24 ENCOUNTER — APPOINTMENT (OUTPATIENT)
Dept: NUCLEAR MEDICINE | Facility: HOSPITAL | Age: 70
End: 2020-03-24

## 2020-08-25 RX ORDER — ONDANSETRON 4 MG/1
TABLET, FILM COATED ORAL
Qty: 15 TABLET | Refills: 0 | Status: SHIPPED | OUTPATIENT
Start: 2020-08-25

## 2020-10-06 ENCOUNTER — OFFICE VISIT (OUTPATIENT)
Dept: CARDIOLOGY | Facility: CLINIC | Age: 70
End: 2020-10-06

## 2020-10-06 VITALS
HEART RATE: 76 BPM | DIASTOLIC BLOOD PRESSURE: 77 MMHG | WEIGHT: 224 LBS | HEIGHT: 59 IN | BODY MASS INDEX: 45.16 KG/M2 | SYSTOLIC BLOOD PRESSURE: 136 MMHG

## 2020-10-06 DIAGNOSIS — R06.02 SHORTNESS OF BREATH: ICD-10-CM

## 2020-10-06 DIAGNOSIS — I10 ESSENTIAL HYPERTENSION: ICD-10-CM

## 2020-10-06 DIAGNOSIS — R07.89 CHEST PAIN, ATYPICAL: Primary | ICD-10-CM

## 2020-10-06 DIAGNOSIS — E78.5 HYPERLIPIDEMIA, UNSPECIFIED HYPERLIPIDEMIA TYPE: ICD-10-CM

## 2020-10-06 DIAGNOSIS — I25.10 CORONARY ARTERY DISEASE INVOLVING NATIVE CORONARY ARTERY OF NATIVE HEART WITHOUT ANGINA PECTORIS: ICD-10-CM

## 2020-10-06 DIAGNOSIS — R94.31 ABNORMAL EKG: ICD-10-CM

## 2020-10-06 DIAGNOSIS — R53.83 FATIGUE, UNSPECIFIED TYPE: ICD-10-CM

## 2020-10-06 PROCEDURE — 93000 ELECTROCARDIOGRAM COMPLETE: CPT | Performed by: NURSE PRACTITIONER

## 2020-10-06 PROCEDURE — 99214 OFFICE O/P EST MOD 30 MIN: CPT | Performed by: NURSE PRACTITIONER

## 2020-10-06 RX ORDER — LEVOTHYROXINE SODIUM 0.07 MG/1
TABLET ORAL
COMMUNITY
Start: 2020-08-20

## 2020-10-06 RX ORDER — DULOXETIN HYDROCHLORIDE 30 MG/1
CAPSULE, DELAYED RELEASE ORAL
COMMUNITY
Start: 2020-07-28

## 2020-10-06 RX ORDER — LOSARTAN POTASSIUM AND HYDROCHLOROTHIAZIDE 12.5; 5 MG/1; MG/1
TABLET ORAL
COMMUNITY
Start: 2020-09-10

## 2020-10-06 NOTE — PROGRESS NOTES
Subjective:        Yue Joe is a 70 y.o. female who here for follow up    No chief complaint on file.    Shortness of breath    HPI    Yue Joe is a 70-year-old female, who is new to me.  She has a history which includes hypertension, CAD, obesity, and GERD.  She is here today with shortness of breath.  Previous lipid panel in 2016 revealed , HDL 43, LDL 96, and triglycerides 93.  Stress test in 2019 indicated a normal myocardial perfusion study with no evidence of ischemia.  Echo in 2019 revealed EF 64%, no evidence of pericardial effusion.  Cardiac catheter 2017 revealed distal left main 40% stenosis, LAD proximal stent widely patent distal to the stent 50% stenosis minimum diffuse irregularity distally, nondominant circumflex with OM 40 to 50% stenosis, dominant RCA proximal 50% minimum diffuse irregularity distally.  Considering stent widely patent the recommendations were to treat medically.       The following portions of the patient's history were reviewed and updated as appropriate: allergies, current medications, past family history, past medical history, past social history, past surgical history and problem list.    Past Medical History:   Diagnosis Date   • Breast cancer (CMS/HCC)    • Disease of thyroid gland    • Environmental allergies    • GERD (gastroesophageal reflux disease)    • Hyperlipidemia    • Hypertension    • Stroke (CMS/HCC) 90S- EARLY 2000S    NO RESIDUALS         reports that she quit smoking about 15 years ago. She started smoking about 50 years ago. She has a 15.00 pack-year smoking history. She has never used smokeless tobacco. She reports that she does not drink alcohol or use drugs.     Family History   Problem Relation Age of Onset   • Heart disease Mother    • Heart attack Mother    • Hypertension Mother    • Heart attack Father    • Heart disease Father    • Hypertension Father    • Heart attack Sister    • Hypertension Sister    • Hypertension Brother    • Heart  attack Brother    • Heart attack Maternal Grandmother    • Hypertension Maternal Grandmother    • Hypertension Maternal Grandfather    • Heart attack Maternal Grandfather    • Heart attack Paternal Grandmother    • Hypertension Paternal Grandmother        ROS     Review of Systems  Constitutional: No wt loss, fever, +fatigue  Gastrointestinal: No nausea, abdominal pain  Behavioral/Psych: No insomnia or anxiety  Cardiovascular:  + chest pain that come and goes. She feels this is related to her GI issues.  +shorntess of breath      Objective:           Vitals signs and nursing note reviewed.   Constitutional:       Appearance: Well-developed.   HENT:      Head: Normocephalic.      Right Ear: External ear normal.      Left Ear: External ear normal.   Neck:      Musculoskeletal: Normal range of motion.      Vascular: No JVD.   Pulmonary:      Effort: Pulmonary effort is normal. No respiratory distress.      Breath sounds: Normal breath sounds. No stridor. No rales.   Cardiovascular:      Normal rate. Regular rhythm.      No gallop.   Pulses:     Intact distal pulses.   Abdominal:      General: Bowel sounds are normal. There is no distension.      Palpations: Abdomen is soft.      Tenderness: There is no abdominal tenderness. There is no guarding.   Musculoskeletal: Normal range of motion.         General: No tenderness.   Skin:     General: Skin is warm.   Neurological:      Mental Status: Alert and oriented to person, place, and time.      Deep Tendon Reflexes: Reflexes are normal and symmetric.   Psychiatric:         Judgment: Judgment normal.           ECG 12 Lead    Date/Time: 10/6/2020 12:05 PM  Performed by: Martha Nash APRN  Authorized by: Martha Nash APRN   Comparison: compared with previous ECG from 6/27/2019  Similar to previous ECG  Comparison to previous ECG: Nonspecific t waves  Rhythm: sinus rhythm              Impression:      1. Distal left main 40% stenosis  2. LAD proximal stent  widely patent distal to the stent 50% stenosis minimum diffuse irregularity distally  3. Nondominant circumflex with OM 40-50% stenosis  4. Dominant RCA proximal 50% minimum diffuse irregularity distally     Recommendations:      1. Considering stent widely patent and the moderate coronary artery disease patient be treated medically            I sincerely appreciate the opportunity to participate in your patient's care. Please feel free to contact me anytime if I can be of assistance in this or any other way.     Ria Bello MD  7/21/2017  11:28 AM             Interpretation Summary       · Patient BMI is 46.3 kg/m2..  · Findings consistent with an equivocal ECG stress test.  · Left ventricular ejection fraction is normal (Calculated EF = 60%).  · Myocardial perfusion imaging indicates a normal myocardial perfusion study with no evidence of ischemia.  · Impressions are consistent with a low risk study.     Asymptomatic for chest pain, however, did c/o SOA, SpO2 91% (Baseline SpO2 96%) with initial exercise.  Specificity of study reduced secondary to significant motion artifact due to very poor exercise tolerance.   Ectopy: none.  Blood pressure and heart rate response:  Appropriate for Beta-blocker therapy.  Exercise Tolerance: very poor due to c/o SOA and poor conditioning. SpO2  91%,   Reached 19  Of  20 Zen Scale, Stage I.     SOA improved 1 minute into recovery SpO2 97%     Pharmacologic study due to inability to tolerate increasing speed and grade of treadmill due to pulmonary status, poor conditioning and Beta-blocker therapy.     Supervised by: Dr. Bello     Interpretation Summary    · Calculated EF = 64.0%.  · There is no evidence of pericardial effusion.             Current Outpatient Medications:   •  aspirin 81 MG tablet, Take 1 tablet by mouth daily., Disp: 30 tablet, Rfl: 11  •  clopidogrel (PLAVIX) 75 MG tablet, Take 75 mg by mouth Daily., Disp: , Rfl:   •  lisinopril  (PRINIVIL,ZESTRIL) 10 MG tablet, , Disp: , Rfl:   •  loratadine (CLARITIN) 10 MG tablet, Take 10 mg by mouth Daily., Disp: , Rfl:   •  metoprolol tartrate (LOPRESSOR) 25 MG tablet, Take 25 mg by mouth 2 (Two) Times a Day., Disp: , Rfl:   •  nitroglycerin (NITROSTAT) 0.4 MG SL tablet, Place 1 tablet under the tongue Every 5 (Five) Minutes As Needed for Chest Pain. Take no more than 3 doses in 15 minutes., Disp: 25 tablet, Rfl: 3  •  ondansetron (ZOFRAN) 4 MG tablet, TAKE 1 TABLET EVERY 12 HOURS AS NEEDED FOR NAUSEA OR VOMITING., Disp: 15 tablet, Rfl: 0  •  pantoprazole (PROTONIX) 40 MG EC tablet, Take 40 mg by mouth Daily., Disp: , Rfl:   •  pravastatin (PRAVACHOL) 20 MG tablet, pravastatin 20 mg tablet  TAKE 1 TABLET AT BEDTIME, Disp: , Rfl:   •  venlafaxine XR (EFFEXOR-XR) 75 MG 24 hr capsule, venlafaxine ER 75 mg capsule,extended release 24 hr  Take 1 capsule every day by oral route for 30 days., Disp: , Rfl:   •  vitamin D (ERGOCALCIFEROL) 53013 UNITS capsule capsule, Take 50,000 Units by mouth 1 (one) time per week., Disp: , Rfl:      Assessment:        Patient Active Problem List   Diagnosis   • Ischemic chest pain (CMS/HCC)   • Essential hypertension   • Morbid obesity due to excess calories (CMS/HCC)   • Unstable angina (CMS/HCC)   • Coronary artery disease involving native coronary artery of native heart without angina pectoris   • Precordial pain   • Nausea and vomiting in adult   • Gastroesophageal reflux disease               Plan:   1  CAD: Previous ischemic work-up as above.  She denies chest pain.  Continue taking Plavix, aspirin, statin, and BB.     Risk reduction for the coronary artery disease, controlling the blood pressure, blood sugar management, cholesterol management, exercise, stress management, and proper compliance with medications and follow-up has been discussed    2.  Hypertention: Today in the office her blood pressures are controlled on current medications.  Continue lisinopril, and  metoprolol tartrate.    Educated patient on exercising for at least 30 minutes a day for 2 to 3 days a week. Importance of controlling hypertension and blood pressure checkup on the regular basis has been explained. Hypertension as a silent killer has been discussed. Risk reduction of the weight and regular exercises to control the hypertension has been explained.    3.  Obesity: Counseled    Significant risk of obesity to CAD, HTN has been explained  Advantages of wt reduction has been explained    4.  Shortness of breath: we will do an echo    5. chest pain: she states the pain comes and goes. None currently. She thinks it has something to do with her GI issues.     6. Sleep apnea: CPAP noncompliant    7. Abnormal ecg: will do stress test       No diagnosis found.    There are no diagnoses linked to this encounter.    COUNSELING: Jaswinder Farfan was given to patient for the following topics: diagnostic results, risk factor reductions, impressions, risks and benefits of treatment options and importance of treatment compliance .       SMOKING COUNSELING: Denies    Echo and stress test. She will follow up with Dr. Bello    Sincerely,   ALINA Prieto  [facility]  10/06/20  11:53 EDT    EMR Dragon/Transcription disclaimer:   Much of this encounter note is an electronic transcription/translation of spoken language to printed text. The electronic translation of spoken language may permit erroneous, or at times, nonsensical words or phrases to be inadvertently transcribed; Although I have reviewed the note for such errors, some may still exist.

## 2020-10-21 ENCOUNTER — APPOINTMENT (OUTPATIENT)
Dept: CARDIOLOGY | Facility: HOSPITAL | Age: 70
End: 2020-10-21

## 2022-10-18 ENCOUNTER — TELEPHONE (OUTPATIENT)
Dept: INTERNAL MEDICINE | Facility: CLINIC | Age: 72
End: 2022-10-18

## 2022-10-18 NOTE — TELEPHONE ENCOUNTER
Caller: ALISSA    Relationship to patient:     Best call back number: [phone]    New or established patient?  [x] New  [] Established    Date of discharge: 10/18/2022    Facility discharged from: [facility]    Diagnosis/Symptoms: N/A    Length of stay (If applicable): N/A    Specialty Only: Did you see a [facility] health provider?    [] Yes  [x] No  If so, who?  FROM [facility] SCHEDULED NEW PATIENT/HOSPITAL FOLLOW UP FOR THIS PATIENT THEY ARE DISMISSING TODAY. APPOINTMENT IS SCHEDULED FOR 10/25/2022 AT 9:30AM.

## 2025-06-09 ENCOUNTER — OFFICE VISIT (OUTPATIENT)
Dept: CARDIOLOGY | Facility: CLINIC | Age: 75
End: 2025-06-09
Payer: MEDICARE

## 2025-06-09 VITALS
HEIGHT: 59 IN | WEIGHT: 201 LBS | DIASTOLIC BLOOD PRESSURE: 79 MMHG | SYSTOLIC BLOOD PRESSURE: 130 MMHG | BODY MASS INDEX: 40.52 KG/M2 | HEART RATE: 59 BPM

## 2025-06-09 DIAGNOSIS — E78.2 MIXED HYPERLIPIDEMIA: ICD-10-CM

## 2025-06-09 DIAGNOSIS — I10 PRIMARY HYPERTENSION: ICD-10-CM

## 2025-06-09 DIAGNOSIS — I25.10 CORONARY ARTERY DISEASE INVOLVING NATIVE CORONARY ARTERY OF NATIVE HEART WITHOUT ANGINA PECTORIS: Primary | ICD-10-CM

## 2025-06-09 PROCEDURE — 3078F DIAST BP <80 MM HG: CPT | Performed by: INTERNAL MEDICINE

## 2025-06-09 PROCEDURE — 3075F SYST BP GE 130 - 139MM HG: CPT | Performed by: INTERNAL MEDICINE

## 2025-06-09 PROCEDURE — 99204 OFFICE O/P NEW MOD 45 MIN: CPT | Performed by: INTERNAL MEDICINE

## 2025-06-09 RX ORDER — BISMUTH SUBSALICYLATE 262MG/15ML
SUSPENSION, ORAL (FINAL DOSE FORM) ORAL
COMMUNITY
Start: 2025-04-06

## 2025-06-09 RX ORDER — ROSUVASTATIN CALCIUM 10 MG/1
10 TABLET, COATED ORAL NIGHTLY
Qty: 90 TABLET | Refills: 3 | Status: SHIPPED | OUTPATIENT
Start: 2025-06-09

## 2025-06-09 RX ORDER — PANTOPRAZOLE SODIUM 40 MG/1
40 TABLET, DELAYED RELEASE ORAL DAILY
COMMUNITY
Start: 2022-10-17

## 2025-06-09 RX ORDER — METOPROLOL TARTRATE 25 MG/1
25 TABLET, FILM COATED ORAL 2 TIMES DAILY
Qty: 60 TABLET | Refills: 6 | Status: SHIPPED | OUTPATIENT
Start: 2025-06-09

## 2025-06-09 NOTE — PROGRESS NOTES
"Chief Complaint  Coronary Artery Disease,  TWO HEART ATTACKS AND TWO STENTS , and Establish Care    Subjective        Yue Joe presents to [facility] CARDIOLOGY  History of present illness:    Patient is a 75-year-old female with remote history of LAD stent in the past which was apparently a 2.5 x 50 mm stent back in .  She also had stent to the LAD again back in  at [facility] which appears to be a 2.5 x 28 mm stent.  She has not seen a cardiologist since .  She states that she is \"supposed to be on a cholesterol pill.\"  She states she stopped it as she heard a lot of bad things about it.  She is not very active as she feels tired most of the time.  She does not smoke or drink alcohol.  Mother and father both  of a heart attack.      Past Medical History:   Diagnosis Date    Breast cancer     Disease of thyroid gland     Environmental allergies     GERD (gastroesophageal reflux disease)     Hyperlipidemia     Hypertension     Stroke 90S- EARLY     NO RESIDUALS         Past Surgical History:   Procedure Laterality Date    BREAST LUMPECTOMY Left     CARDIAC CATHETERIZATION N/A 2016    Procedure: Left Heart Cath;  Surgeon: Ria Bello MD;  Location: [facility] CATH INVASIVE LOCATION;  Service:     CARDIAC CATHETERIZATION      CARDIAC CATHETERIZATION N/A 2017    Procedure: Left Heart Cath;  Surgeon: Ria Bello MD;  Location[facility] CATH [facility];  Service:     CARDIAC CATHETERIZATION N/A 2017    Procedure: Coronary angiography;  Surgeon: Ria Bello MD;  Location: [facility] CATH INVASIVE LOCATION;  Service:     CARDIAC CATHETERIZATION N/A 2017    Procedure: Left ventriculography;  Surgeon: Ria Bello MD;  Location: [facility] INVASIVE LOCATION;  Service:     COLONOSCOPY  2018    CORONARY ANGIOPLASTY WITH STENT PLACEMENT  2016    ENDOSCOPY N/A 2020    Procedure: " ESOPHAGOGASTRODUODENOSCOPY with biopsies;  Surgeon: Tami English MD;  Location: [facility] ENDOSCOPY;  Service: Gastroenterology    EYE SURGERY      GALLBLADDER SURGERY      OTHER SURGICAL HISTORY            Social History     Socioeconomic History    Marital status:    Tobacco Use    Smoking status: Former     Current packs/day: 0.00     Average packs/day: 1 pack/day for 35.0 years (35.0 ttl pk-yrs)     Types: Cigarettes     Start date: 1970     Quit date: 2005     Years since quittin.8    Smokeless tobacco: Never    Tobacco comments:     QUIT 30 YEARS AGO   Substance and Sexual Activity    Alcohol use: No    Drug use: No    Sexual activity: Defer     Birth control/protection: Post-menopausal         Family History   Problem Relation Age of Onset    Heart disease Mother     Heart attack Mother     Hypertension Mother     Heart attack Father     Heart disease Father     Hypertension Father     Heart attack Sister     Hypertension Sister     Hypertension Brother     Heart attack Brother     Heart attack Maternal Grandmother     Hypertension Maternal Grandmother     Hypertension Maternal Grandfather     Heart attack Maternal Grandfather     Heart attack Paternal Grandmother     Hypertension Paternal Grandmother           Allergies   Allergen Reactions    Latex Other (See Comments)     Skin tears            Current Outpatient Medications:     clopidogrel (PLAVIX) 75 MG tablet, Take 1 tablet by mouth Daily., Disp: , Rfl:     DULoxetine (CYMBALTA) 30 MG capsule, , Disp: , Rfl:     Lancets Ultra Thin 30G misc, , Disp: , Rfl:     levothyroxine (SYNTHROID, LEVOTHROID) 75 MCG tablet, , Disp: , Rfl:     loratadine (CLARITIN) 10 MG tablet, Take 1 tablet by mouth Daily., Disp: , Rfl:     losartan-hydrochlorothiazide (HYZAAR) 50-12.5 MG per tablet, , Disp: , Rfl:     metoprolol tartrate (LOPRESSOR) 25 MG tablet, Take 1 tablet by mouth 2 (Two) Times a Day., Disp: 60 tablet, Rfl: 6    nitroglycerin  "(NITROSTAT) 0.4 MG SL tablet, Place 1 tablet under the tongue Every 5 (Five) Minutes As Needed for Chest Pain. Take no more than 3 doses in 15 minutes., Disp: 25 tablet, Rfl: 3    ondansetron (ZOFRAN) 4 MG tablet, TAKE 1 TABLET EVERY 12 HOURS AS NEEDED FOR NAUSEA OR VOMITING., Disp: 15 tablet, Rfl: 0    pantoprazole (PROTONIX) 40 MG EC tablet, Take 1 tablet by mouth Daily., Disp: , Rfl:     vitamin D (ERGOCALCIFEROL) 55629 UNITS capsule capsule, Take 1 capsule by mouth 1 (One) Time Per Week., Disp: , Rfl:     aspirin 81 MG tablet, Take 1 tablet by mouth daily. (Patient not taking: Reported on 6/9/2025), Disp: 30 tablet, Rfl: 11    rosuvastatin (Crestor) 10 MG tablet, Take 1 tablet by mouth Every Night., Disp: 90 tablet, Rfl: 3      ROS:  Cardiac review of systems positive for fatigue    Objective     /79   Pulse 59   Ht 149.9 cm (59\")   Wt 91.2 kg (201 lb)   BMI 40.60 kg/m²       General Appearance:   well developed  well nourished  HENT:   oropharynx moist  lips not cyanotic  Respiratory:  no respiratory distress  normal breath sounds  no rales  Cardiovascular:  no jugular venous distention  regular rhythm  S1 normal, S2 normal  no S3, no S4   no murmur  no rub, no thrill  No carotid bruit  pedal pulses normal  lower extremity edema: none    Musculoskeletal:  no clubbing of fingers.   normocephalic, head atraumatic  Skin:   warm, dry  Psychiatric:  judgement and insight appropriate  normal mood and affect    ECHO:  Results for orders placed in visit on 06/27/19    Adult Transthoracic Echo Complete W/ Cont if Necessary Per Protocol    Interpretation Summary  · Calculated EF = 64.0%.  · There is no evidence of pericardial effusion.    STRESS:  Results for orders placed in visit on 06/27/19    Stress Test With Myocardial Perfusion One Day    Interpretation Summary  · Patient BMI is 46.3 kg/m2..  · Findings consistent with an equivocal ECG stress test.  · Left ventricular ejection fraction is normal " (Calculated EF = 60%).  · Myocardial perfusion imaging indicates a normal myocardial perfusion study with no evidence of ischemia.  · Impressions are consistent with a low risk study.    Asymptomatic for chest pain, however, did c/o SOA, SpO2 91% (Baseline SpO2 96%) with initial exercise.  Specificity of study reduced secondary to significant motion artifact due to very poor exercise tolerance.  Ectopy: none.  Blood pressure and heart rate response:  Appropriate for Beta-blocker therapy.  Exercise Tolerance: very poor due to c/o SOA and poor conditioning. SpO2  91%,  Reached 19  Of  20 Zen Scale, Stage I.     SOA improved 1 minute into recovery SpO2 97%    Pharmacologic study due to inability to tolerate increasing speed and grade of treadmill due to pulmonary status, poor conditioning and Beta-blocker therapy.    Supervised by: Dr. Bello    CATH:  Results for orders placed during the hospital encounter of 07/21/17    Cardiac Catheterization/Vascular Study    Narrative  Table formatting from the original result was not included.    · successful right and lt coronary angiogram as well as LV milligrams  · Distal left main 40%  · LAD proximal stent widely patent distal to the stent 50% stenosis minimum diffuse irregularity distally  · Nondominant circumflex with OM 40-50% stenosis  · Dominant RCA proximal 50% minimum diffuse irregularity distally  · Normal LV gram    July 21, 2017    Yue Joe  1950  67 y.o.  female  [id number]    Procedures Performed    1. Left heart catheterization  2. Selective coronary angiography  3. Left ventriculography        :   Ria Bello MD    Vascular Access Site: Right radial    Indication for procedure: Positive stress test    Referring Physician:      Pertinent History  @PMH@  @PSH@    Procedure Note    After discussing the risks, benefits, and alternatives of the procedure, informed consent was obtained.  The vascular access site was prepped and  draped in the usual sterile fashion.  2% lidocaine was used for local anesthesia. Appropriate landmarks were assessed.  A 5 Persian short sheath was inserted in the artery using the modified Seldinger technique.    Selective coronary angiography was performed with JL4 and JR4 diagnostic catheters. Left ventriculogram  was performed with an angled pigtail catheter.  All exchanges were performed over the wire.  There were no apparent acute or early complications.  The patient tolerated the procedure well and was transferred to the recovery area in stable condition.    Artery hemostasis was achieved with manual pressure.        Contrast:    70  cc  Complications:  None  Blood Loss: minimal      Hemodynamics    Pressures    Ao:    140/80 mmHg  LV:    140/10  mmHg  End-diastolic pressure:  10  mmHg  No significant aortic valvular gradient on pullback    Coronary Angiography    Left Main:  The left main originates in the left coronary cusp.  It bifurcates and gives rise to the LAD and circumflex system.  Distal 40% stenosis    Left Anterior Descending:  LAD proximal stent widely patent distal to the stent 50% stenosis minimum diffuse irregularity distally    Left Circumflex:  Nondominant circumflex with OM 40-50% stenosis    Right Coronary Artery:  The right coronary artery originates in the right coronary cusp.  Dominant RCA proximal 50% minimum diffuse irregularity distally    Left Ventriculography:    Estimated Ejection Fraction: 60 %  Wall motion abnormalities:  None  Mitral Regurgitation:  None    Impression:    1. Distal left main 40% stenosis  2. LAD proximal stent widely patent distal to the stent 50% stenosis minimum diffuse irregularity distally  3. Nondominant circumflex with OM 40-50% stenosis  4. Dominant RCA proximal 50% minimum diffuse irregularity distally    Recommendations:    1. Considering stent widely patent and the moderate coronary artery disease patient be treated medically        I sincerely  appreciate the opportunity to participate in your patient's care. Please feel free to contact me anytime if I can be of assistance in this or any other way.    Ria Bello MD  7/21/2017  11:28 AM    BMP:     Glucose   Date Value Ref Range Status   12/23/2019 204 (H) 65 - 99 mg/dL Final     BUN   Date Value Ref Range Status   12/23/2019 12 8 - 23 mg/dL Final     Creatinine   Date Value Ref Range Status   12/23/2019 0.86 0.57 - 1.00 mg/dL Final     Sodium   Date Value Ref Range Status   12/23/2019 141 136 - 145 mmol/L Final     Potassium   Date Value Ref Range Status   12/23/2019 4.9 3.5 - 5.2 mmol/L Final     Chloride   Date Value Ref Range Status   12/23/2019 102 98 - 107 mmol/L Final     Total CO2   Date Value Ref Range Status   12/23/2019 26.3 22.0 - 29.0 mmol/L Final     Calcium   Date Value Ref Range Status   12/23/2019 9.5 8.6 - 10.5 mg/dL Final     BUN/Creatinine Ratio   Date Value Ref Range Status   12/23/2019 14.0 7.0 - 25.0 Final     Anion Gap   Date Value Ref Range Status   07/19/2017 11.2 mmol/L Final     LIPIDS:  Total Cholesterol   Date Value Ref Range Status   06/25/2016 158 0 - 200 mg/dL Final     Triglycerides   Date Value Ref Range Status   06/25/2016 93 0 - 150 mg/dL Final     HDL Cholesterol   Date Value Ref Range Status   06/25/2016 43 40 - 60 mg/dL Final     LDL Cholesterol    Date Value Ref Range Status   06/25/2016 96 0 - 100 mg/dL Final     VLDL Cholesterol   Date Value Ref Range Status   06/25/2016 18.6 5 - 40 mg/dL Final     LDL/HDL Ratio   Date Value Ref Range Status   06/25/2016 2.24  Final         Procedures             ASSESSMENT:  Diagnoses and all orders for this visit:    1. Coronary artery disease involving native coronary artery of native heart without angina pectoris (Primary)    2. Primary hypertension    3. Mixed hyperlipidemia    Other orders  -     metoprolol tartrate (LOPRESSOR) 25 MG tablet; Take 1 tablet by mouth 2 (Two) Times a Day.  Dispense: 60 tablet; Refill:  6  -     rosuvastatin (Crestor) 10 MG tablet; Take 1 tablet by mouth Every Night.  Dispense: 90 tablet; Refill: 3         PLAN:    1.  For patient's fatigue I did ask her to cut her metoprolol to 25 mg p.o. twice daily.  If we can wean this off the next time we see there and possibly add amlodipine if needed I would like to see how she feels off this medication.  2.  Will try to get the report of the second stent from [facility] back in 2022.  3.  Blood pressures under good control.  4.  Patient is in agreement to try Crestor 10 nightly.  When she returns we will recheck a fasted lipid profile.  5.  Patient notes shortness of breath but only when she is walking with like a purse in her arm.  When she is walking all around with the cart she does not get short of breath.  This does not sound consistent with cardiac etiology.  6.  Continue the Plavix.  The patient notes no problems with bleeding.  7.  Will continue to follow closely.      Return in about 6 weeks (around 7/21/2025) for lisset jordan.     Patient was given instructions and counseling regarding her condition or for health maintenance advice. Please see specific information pulled into the AVS if appropriate.         Francis Jaramillo MD   6/9/2025  15:57 EDT

## 2025-08-01 ENCOUNTER — LAB (OUTPATIENT)
Facility: HOSPITAL | Age: 75
End: 2025-08-01
Payer: MEDICARE

## 2025-08-01 ENCOUNTER — OFFICE VISIT (OUTPATIENT)
Dept: CARDIOLOGY | Facility: CLINIC | Age: 75
End: 2025-08-01
Payer: MEDICARE

## 2025-08-01 VITALS
WEIGHT: 203 LBS | BODY MASS INDEX: 40.92 KG/M2 | HEIGHT: 59 IN | HEART RATE: 75 BPM | DIASTOLIC BLOOD PRESSURE: 85 MMHG | OXYGEN SATURATION: 96 % | SYSTOLIC BLOOD PRESSURE: 153 MMHG

## 2025-08-01 DIAGNOSIS — E78.2 MIXED HYPERLIPIDEMIA: ICD-10-CM

## 2025-08-01 DIAGNOSIS — I10 PRIMARY HYPERTENSION: ICD-10-CM

## 2025-08-01 DIAGNOSIS — I25.10 CORONARY ARTERY DISEASE INVOLVING NATIVE CORONARY ARTERY OF NATIVE HEART WITHOUT ANGINA PECTORIS: Primary | ICD-10-CM

## 2025-08-01 LAB
ALBUMIN SERPL-MCNC: 4.2 G/DL (ref 3.5–5.2)
ALBUMIN/GLOB SERPL: 1.6 G/DL
ALP SERPL-CCNC: 69 U/L (ref 39–117)
ALT SERPL W P-5'-P-CCNC: 20 U/L (ref 1–33)
ANION GAP SERPL CALCULATED.3IONS-SCNC: 12 MMOL/L (ref 5–15)
AST SERPL-CCNC: 20 U/L (ref 1–32)
BILIRUB SERPL-MCNC: 0.4 MG/DL (ref 0–1.2)
BUN SERPL-MCNC: 11 MG/DL (ref 8–23)
BUN/CREAT SERPL: 9.3 (ref 7–25)
CALCIUM SPEC-SCNC: 9.4 MG/DL (ref 8.6–10.5)
CHLORIDE SERPL-SCNC: 103 MMOL/L (ref 98–107)
CHOLEST SERPL-MCNC: 116 MG/DL (ref 0–200)
CO2 SERPL-SCNC: 25 MMOL/L (ref 22–29)
CREAT SERPL-MCNC: 1.18 MG/DL (ref 0.57–1)
EGFRCR SERPLBLD CKD-EPI 2021: 48.9 ML/MIN/1.73
GLOBULIN UR ELPH-MCNC: 2.7 GM/DL
GLUCOSE SERPL-MCNC: 148 MG/DL (ref 65–99)
HDLC SERPL-MCNC: 57 MG/DL (ref 40–60)
LDLC SERPL CALC-MCNC: 43 MG/DL (ref 0–100)
LDLC/HDLC SERPL: 0.75 {RATIO}
POTASSIUM SERPL-SCNC: 4.4 MMOL/L (ref 3.5–5.2)
PROT SERPL-MCNC: 6.9 G/DL (ref 6–8.5)
SODIUM SERPL-SCNC: 140 MMOL/L (ref 136–145)
TRIGL SERPL-MCNC: 80 MG/DL (ref 0–150)
VLDLC SERPL-MCNC: 16 MG/DL (ref 5–40)

## 2025-08-01 PROCEDURE — 36415 COLL VENOUS BLD VENIPUNCTURE: CPT

## 2025-08-01 PROCEDURE — 80061 LIPID PANEL: CPT

## 2025-08-01 PROCEDURE — 80053 COMPREHEN METABOLIC PANEL: CPT

## 2025-08-01 NOTE — ASSESSMENT & PLAN NOTE
No lipid panel on file I did reach out to patient's PCP to get most recent lipid.  Patient unfortunately was recently started on Crestor 10 mg at night but she has been intolerant to statins in the past.  She reports having muscle cramps to her bones.  Advised patient to stop statin.  Patient was agreeable to PCSK9 inhibitors.  Will send prescription for Repatha to pharmacy.  Did discuss with patient if Repatha is unaffordable we could try Praluent or even Leqvio.  Patient is agreeable to any of these that is cost affordable.  Once patient starts medication we will have the patient recheck lipid panel 6 to 8 weeks after starting the medication.  Since patient has been taking the Crestor for 6 weeks I will order a lipid panel so we can see if her numbers have improved

## 2025-08-01 NOTE — ASSESSMENT & PLAN NOTE
Patient has significant coronary artery disease with stent placed in the proximal LAD 6/24/2016, patient did have another heart catheterization 7/21/2017 which treated medically due to distal left main 40% stenosis, LAD proximal stent widely patent distal to the stent 50% stenosis, nondominant circumflex with OM 40 to 50% stenosis, dominant RCA proximal 50% minimal diffuse irregular distally.  Patient did undergo another left heart catheterization 10/17/2022 one-vessel severe obstructive CAD involving proximal mid LAD 80 to 90% stenosis, successful PCI of the LAD with drug-eluting stent.    Patient currently denies any angina or anginal-like symptoms.  Continue aspirin 81 mg daily, Plavix 75 mg daily, and metoprolol titrate 25 mg twice daily.  Patient is unable to tolerate statins.  Patient was agreeable to start PCSK9 inhibitor

## 2025-08-01 NOTE — PROGRESS NOTES
Chief Complaint  Follow-up (6 week follow up )    Subjective        Yue Joe presents to [facility] CARDIOLOGY     History of Present Illness     Ms. Joe patient is a 75-year-old female with history of coronary artery disease Left heart catheterization 6/24/2016 LAD is proximal 80% reduced, Left heart catheterization 10/17/2022 [facility] one-vessel severe obstructive CAD involving proximal mid LAD 80 to 90% stenosis, successful PCI of LAD with BUCKY.  She does have family history of her mother and father both dying of a heart attack.  Patient reports she has been unable to tolerate statins due to muscle aches and cramps.  She was recently tried again on Crestor.  She reports right after she started taking this medication she started having cramps all over her body which she reports she can feel in her bones especially mid chest to her back.  She feels as though it made a knot in her throat.  Patient reports she did have her metoprolol cut back at her last visit due to feeling fatigue.  She reports she is felt much better.  Patient reports she is not very active but she denies having any chest pain or dyspnea with walking.  She currently denies any palpitations, dizziness, syncope episodes or edema    Past Medical History:   Diagnosis Date    Breast cancer     Disease of thyroid gland     Environmental allergies     GERD (gastroesophageal reflux disease)     Hyperlipidemia     Hypertension     Stroke 90S- EARLY 2000S       Allergies   Allergen Reactions    Latex Other (See Comments)     Skin tears        Past Surgical History:   Procedure Laterality Date    BREAST LUMPECTOMY Left     CARDIAC CATHETERIZATION N/A 6/24/2016    Procedure: Left Heart Cath;  Surgeon: Ria Bello MD;  Location: [facility] INVASIVE LOCATION;  Service:     CARDIAC CATHETERIZATION      CARDIAC CATHETERIZATION N/A 7/21/2017    Procedure: Left Heart Cath;  Surgeon: Ria Bello MD;   Location: [facility] CATH INVASIVE LOCATION;  Service:     CARDIAC CATHETERIZATION N/A 7/21/2017    Procedure: Coronary angiography;  Surgeon: Ria Bello MD;  Location: [facility] CATH INVASIVE LOCATION;  Service:     CARDIAC CATHETERIZATION N/A 7/21/2017    Procedure: Left ventriculography;  Surgeon: Ria Bello MD;  Location: [facility] CATH INVASIVE LOCATION;  Service:     COLONOSCOPY  2018    CORONARY ANGIOPLASTY WITH STENT PLACEMENT  06/24/2016    ENDOSCOPY N/A 1/29/2020    Procedure: ESOPHAGOGASTRODUODENOSCOPY with biopsies;  Surgeon: Tami English MD;  Location: [facility] ENDOSCOPY;  Service: Gastroenterology    EYE SURGERY      GALLBLADDER SURGERY      OTHER SURGICAL HISTORY          Social History  She  reports that she quit smoking about 20 years ago. Her smoking use included cigarettes. She started smoking about 55 years ago. She has a 35 pack-year smoking history. She has never used smokeless tobacco. She reports that she does not drink alcohol and does not use drugs.    Family History  Her family history includes Heart attack in her brother, father, maternal grandfather, maternal grandmother, mother, paternal grandmother, and sister; Heart disease in her father and mother; Hypertension in her brother, father, maternal grandfather, maternal grandmother, mother, paternal grandmother, and sister.       Current Outpatient Medications on File Prior to Visit   Medication Sig    aspirin 81 MG tablet Take 1 tablet by mouth Daily.    clopidogrel (PLAVIX) 75 MG tablet Take 1 tablet by mouth Daily.    DULoxetine (CYMBALTA) 30 MG capsule     Lancets Ultra Thin 30G misc     levothyroxine (SYNTHROID, LEVOTHROID) 75 MCG tablet     loratadine (CLARITIN) 10 MG tablet Take 1 tablet by mouth Daily.    losartan-hydrochlorothiazide (HYZAAR) 50-12.5 MG per tablet     metoprolol tartrate (LOPRESSOR) 25 MG tablet Take 1 tablet by mouth 2 (Two) Times a Day. (Patient taking differently: Take 1 tablet by mouth Daily.  "Once daily only)    nitroglycerin (NITROSTAT) 0.4 MG SL tablet Place 1 tablet under the tongue Every 5 (Five) Minutes As Needed for Chest Pain. Take no more than 3 doses in 15 minutes.    ondansetron (ZOFRAN) 4 MG tablet TAKE 1 TABLET EVERY 12 HOURS AS NEEDED FOR NAUSEA OR VOMITING.    pantoprazole (PROTONIX) 40 MG EC tablet Take 1 tablet by mouth Daily.    vitamin D (ERGOCALCIFEROL) 90969 UNITS capsule capsule Take 1 capsule by mouth 1 (One) Time Per Week.    [DISCONTINUED] rosuvastatin (Crestor) 10 MG tablet Take 1 tablet by mouth Every Night.     No current facility-administered medications on file prior to visit.         Review of Systems   Respiratory:  Negative for chest tightness and shortness of breath.    Cardiovascular:  Negative for chest pain, palpitations and leg swelling.   Gastrointestinal:  Negative for nausea, vomiting and indigestion.   Musculoskeletal:  Positive for myalgias.   Neurological:  Negative for dizziness, syncope and light-headedness.        Objective   Vitals:    08/01/25 0911   BP: 153/85   Pulse: 75   SpO2: 96%   Weight: 92.1 kg (203 lb)   Height: 149.9 cm (59.02\")         Physical Exam   General : Alert, awake, no acute distress  Neck : Supple, no carotid bruit, no jugular venous distention  CVS : Regular rate and rhythm, no murmur, no rubs or gallops  Lungs: Clear to auscultation bilaterally, no crackles or rhonchi  Abdomen: Soft, nontender, bowel sounds active  Extremities: Warm, well-perfused, no pedal edema      Result Review     The following data was reviewed by ALINA Pederson  proBNP   Date Value Ref Range Status   07/27/2016 314.0 5.0 - 900.0 pg/mL Final          No results found for: \"TSH\"   No results found for: \"FREET4\"   D-Dimer, Quantitative   Date Value Ref Range Status   07/27/2016 0.54 (H) 0.00 - 0.49 MCGFEU/mL Final     Magnesium   Date Value Ref Range Status   10/17/2022 1.9 1.7 - 2.3 mg/dL Final      No results found for: \"DIGOXIN\"   Lab Results   Component " Value Date    TROPONINT <0.010 07/27/2016                 Results for orders placed in visit on 06/27/19    Adult Transthoracic Echo Complete W/ Cont if Necessary Per Protocol 08/02/2019 10:08 AM    Interpretation Summary  · Calculated EF = 64.0%.  · There is no evidence of pericardial effusion.    Results for orders placed in visit on 06/27/19    Stress Test With Myocardial Perfusion One Day 08/01/2019  2:26 PM    Interpretation Summary  · Patient BMI is 46.3 kg/m2..  · Findings consistent with an equivocal ECG stress test.  · Left ventricular ejection fraction is normal (Calculated EF = 60%).  · Myocardial perfusion imaging indicates a normal myocardial perfusion study with no evidence of ischemia.  · Impressions are consistent with a low risk study.    Asymptomatic for chest pain, however, did c/o SOA, SpO2 91% (Baseline SpO2 96%) with initial exercise.  Specificity of study reduced secondary to significant motion artifact due to very poor exercise tolerance.  Ectopy: none.  Blood pressure and heart rate response:  Appropriate for Beta-blocker therapy.  Exercise Tolerance: very poor due to c/o SOA and poor conditioning. SpO2  91%,  Reached 19  Of  20 Zen Scale, Stage I.     SOA improved 1 minute into recovery SpO2 97%    Pharmacologic study due to inability to tolerate increasing speed and grade of treadmill due to pulmonary status, poor conditioning and Beta-blocker therapy.    Supervised by: Dr. Bello        Procedures        Assessment and Plan   Diagnoses and all orders for this visit:    1. Coronary artery disease involving native coronary artery of native heart without angina pectoris (Primary)  Assessment & Plan:  Patient has significant coronary artery disease with stent placed in the proximal LAD 6/24/2016, patient did have another heart catheterization 7/21/2017 which treated medically due to distal left main 40% stenosis, LAD proximal stent widely patent distal to the stent 50% stenosis,  nondominant circumflex with OM 40 to 50% stenosis, dominant RCA proximal 50% minimal diffuse irregular distally.  Patient did undergo another left heart catheterization 10/17/2022 one-vessel severe obstructive CAD involving proximal mid LAD 80 to 90% stenosis, successful PCI of the LAD with drug-eluting stent.    Patient currently denies any angina or anginal-like symptoms.  Continue aspirin 81 mg daily, Plavix 75 mg daily, and metoprolol titrate 25 mg twice daily.  Patient is unable to tolerate statins.  Patient was agreeable to start PCSK9 inhibitor      2. Primary hypertension  Assessment & Plan:  Patient's blood pressure is slightly elevated at today's visit.  She reports that with only taking her metoprolol once a day she is feeling much improved.  Did advise patient to continue monitoring her blood pressure at home along with low-sodium diet.  If patient's blood pressure continues to stay elevated then we will need to adjust medication at that time.  Continue losartan-hydrochlorothiazide 50-12.5 mg daily and metoprolol tartrate 25 mg daily.      3. Mixed hyperlipidemia  Assessment & Plan:  No lipid panel on file I did reach out to patient's PCP to get most recent lipid.  Patient unfortunately was recently started on Crestor 10 mg at night but she has been intolerant to statins in the past.  She reports having muscle cramps to her bones.  Advised patient to stop statin.  Patient was agreeable to PCSK9 inhibitors.  Will send prescription for Repatha to pharmacy.  Did discuss with patient if Repatha is unaffordable we could try Praluent or even Leqvio.  Patient is agreeable to any of these that is cost affordable.  Once patient starts medication we will have the patient recheck lipid panel 6 to 8 weeks after starting the medication.  Since patient has been taking the Crestor for 6 weeks I will order a lipid panel so we can see if her numbers have improved    Orders:  -     Evolocumab (REPATHA) solution prefilled  syringe injection; Inject 1 mL under the skin into the appropriate area as directed Every 14 (Fourteen) Days.  Dispense: 2 mL; Refill: 12  -     Lipid Panel; Future  -     Comprehensive Metabolic Panel; Future                Follow Up   Return in about 3 months (around 11/1/2025) for ALINA Pederson see Dr Jaramillo in 9 months .    Yue Joe  reports that she quit smoking about 20 years ago. Her smoking use included cigarettes. She started smoking about 55 years ago. She has a 35 pack-year smoking history. She has never used smokeless tobacco. I have educated her on the risk of diseases from using tobacco products such as cancer, COPD, and heart disease.                  Patient was given instructions and counseling regarding her condition or for health maintenance advice. Please see specific information pulled into the AVS if appropriate.     Signed,  ALINA Pederson  08/01/2025     Dictated Utilizing Dragon Dictation: Please note that portions of this note were completed with a voice recognition program.  Part of this note may be an electronic transcription/translation of spoken language to printed text using the Dragon Dictation System.

## 2025-08-01 NOTE — ASSESSMENT & PLAN NOTE
Patient's blood pressure is slightly elevated at today's visit.  She reports that with only taking her metoprolol once a day she is feeling much improved.  Did advise patient to continue monitoring her blood pressure at home along with low-sodium diet.  If patient's blood pressure continues to stay elevated then we will need to adjust medication at that time.  Continue losartan-hydrochlorothiazide 50-12.5 mg daily and metoprolol tartrate 25 mg daily.

## 2025-08-04 ENCOUNTER — DOCUMENTATION (OUTPATIENT)
Dept: CARDIOLOGY | Facility: CLINIC | Age: 75
End: 2025-08-04
Payer: MEDICARE

## 2025-08-04 ENCOUNTER — RESULTS FOLLOW-UP (OUTPATIENT)
Dept: CARDIOLOGY | Facility: CLINIC | Age: 75
End: 2025-08-04
Payer: MEDICARE

## 2025-08-04 ENCOUNTER — SPECIALTY PHARMACY (OUTPATIENT)
Dept: CARDIOLOGY | Facility: CLINIC | Age: 75
End: 2025-08-04
Payer: MEDICARE

## 2025-08-04 ENCOUNTER — SPECIALTY PHARMACY (OUTPATIENT)
Facility: HOSPITAL | Age: 75
End: 2025-08-04
Payer: MEDICARE

## 2025-08-04 RX ORDER — EVOLOCUMAB 140 MG/ML
140 INJECTION, SOLUTION SUBCUTANEOUS
Qty: 6 ML | Refills: 3 | Status: SHIPPED | OUTPATIENT
Start: 2025-08-04

## (undated) DEVICE — GLIDESHEATH BASIC HYDROPHILIC COATED INTRODUCER SHEATH: Brand: GLIDESHEATH

## (undated) DEVICE — KT MANIFLD CARDIAC

## (undated) DEVICE — ADAPT CLN BIOGUARD AIR/H2O DISP

## (undated) DEVICE — FRCP BX RADJAW4 NDL 2.8 240CM LG OG BX40

## (undated) DEVICE — KT ORCA ORCAPOD DISP STRL

## (undated) DEVICE — LN SMPL CO2 SHTRM SD STREAM W/M LUER

## (undated) DEVICE — GW EMR FIX EXCHG J STD .035 3MM 260CM

## (undated) DEVICE — CATH VENT MIV RADL PIG ST TIP 4F 110CM

## (undated) DEVICE — CATH DIAG IMPULSE FL3.5 5F 100CM

## (undated) DEVICE — CATH DIAG IMPULSE FR4 5F 100CM

## (undated) DEVICE — TUBING, SUCTION, 1/4" X 10', STRAIGHT: Brand: MEDLINE

## (undated) DEVICE — PK CATH CARD 40

## (undated) DEVICE — CANN O2 ETCO2 FITS ALL CONN CO2 SMPL A/ 7IN DISP LF

## (undated) DEVICE — SENSR O2 OXIMAX FNGR A/ 18IN NONSTR

## (undated) DEVICE — BITEBLOCK OMNI BLOC